# Patient Record
Sex: MALE | Race: WHITE | NOT HISPANIC OR LATINO | Employment: OTHER | URBAN - METROPOLITAN AREA
[De-identification: names, ages, dates, MRNs, and addresses within clinical notes are randomized per-mention and may not be internally consistent; named-entity substitution may affect disease eponyms.]

---

## 2018-12-18 ENCOUNTER — OFFICE VISIT (OUTPATIENT)
Dept: GASTROENTEROLOGY | Facility: CLINIC | Age: 78
End: 2018-12-18

## 2018-12-18 VITALS — SYSTOLIC BLOOD PRESSURE: 181 MMHG | DIASTOLIC BLOOD PRESSURE: 79 MMHG | WEIGHT: 175 LBS | HEART RATE: 70 BPM

## 2018-12-18 DIAGNOSIS — R10.11 RIGHT UPPER QUADRANT ABDOMINAL PAIN: ICD-10-CM

## 2018-12-18 DIAGNOSIS — R10.12 LEFT UPPER QUADRANT PAIN: Primary | ICD-10-CM

## 2018-12-18 PROCEDURE — 99203 OFFICE O/P NEW LOW 30 MIN: CPT | Performed by: INTERNAL MEDICINE

## 2018-12-18 NOTE — PROGRESS NOTES
PCP:  Jason Vora MD     No referring provider defined for this encounter.    Chief Complaint   Patient presents with   • Abdominal Pain     New patient        HPI   The patient is an 78-year-old here for a consultation.  He was having abdominal pain.  He was tested for Helicobacter pylori and this was negative.  He had an ultrasound of the right upper quadrant which apparently just showed fatty liver.  At that time, he was having pain in the right upper and left upper quadrant.  He started himself on a FODMAP diet.  This appeared to eliminate his pain.  He also stopped eating bread.  His symptoms have been improved over the last 5 weeks.  He doesn't complain of any blood in the stool.  His bowel habits appear to be doing well.  His ultrasound was performed in August of this year.  Looking at old records his last colonoscopy was in November 2014.  He had several small adenomatous polyps removed.  He had diverticulosis as well.  A 5 year follow-up is suggested.  Is enjoyed relatively good health.    No Known Allergies     No current outpatient medications on file.     History reviewed. No pertinent past medical history.    Past Surgical History:   Procedure Laterality Date   • APPENDECTOMY     • CYST REMOVAL          Social History     Socioeconomic History   • Marital status:      Spouse name: Not on file   • Number of children: Not on file   • Years of education: Not on file   • Highest education level: Not on file   Social Needs   • Financial resource strain: Not on file   • Food insecurity - worry: Not on file   • Food insecurity - inability: Not on file   • Transportation needs - medical: Not on file   • Transportation needs - non-medical: Not on file   Occupational History   • Not on file   Tobacco Use   • Smoking status: Former Smoker, quit 1978    • Smokeless tobacco: Never Used   Substance and Sexual Activity   • Alcohol use: No     Frequency: Never   • Drug use: No   • Sexual activity: Not  on file   Other Topics Concern   • Not on file   Social History Narrative   • Not on file        History reviewed. No pertinent family history.     Review of Systems   Constitutional: Negative for unexpected weight loss.   HENT: Negative for trouble swallowing.    Eyes: Negative.    Respiratory: Negative.    Gastrointestinal: Positive for abdominal pain. Negative for abdominal distention, anal bleeding, blood in stool, constipation, diarrhea, nausea, rectal pain, vomiting, GERD and indigestion.   Endocrine: Negative.    Genitourinary: Negative.    Musculoskeletal: Negative.    Skin: Negative.    Allergic/Immunologic: Negative.    Neurological: Negative.    Hematological: Negative.    Psychiatric/Behavioral: Negative.         Vitals:    12/18/18 1354   BP: (!) 181/79   Pulse: 70        Physical Exam   General Appearance: Alert, in no acute distress   Head: Normocephalic, without obvious abnormality, atraumatic   Eyes: Lids and lashes normal, conjunctivae and sclerae normal, no icterus, no pallor, corneas clear, PERRLA   Ears: Ears appear intact with no abnormalities noted   Throat: No oral lesions, no thrush, oral mucosa moist   Neck: No adenopathy, supple, trachea midline, no thyromegaly, no JVD   Lungs: Clear to auscultation,respirations regular, even and unlabored Heart: Regular rhythm and normal rate, normal S1 and S2, no murmur, no gallop, no rub, no click   Chest Wall: Symmetrical respiratory expansion   Abdomen: Normal bowel sounds, no masses, no organomegaly, soft non-tender, non-distended, no guarding, no rebound tenderness   Extremities: Moves all extremities well, no edema, no cyanosis, no redness   Skin: No bleeding, bruising or rash   Neurologic: Cranial nerves 2 - 12 grossly intact, no focal deficits       Freeman was seen today for abdominal pain.    Diagnoses and all orders for this visit:    Left upper quadrant pain    Right upper quadrant abdominal pain    The patient had both right upper and left  upper quadrant pain.  This appears to have resolved with a FODMAP diet.  He also has cut out bread.  Certainly I think its reasonable to perform a celiac panel.  He would like to hold off on this.  He is relatively asymptomatic at the moment so I don't want to do any additional testing at this point.  He is not yet due for a colonoscopy.  He will be due to next November.  Certainly if he has any troubles in the meantime we can perform earlier.  I oftentimes forego colonoscopy as a screening test in patients in their 80s, but he is relatively healthy and if that continues it probably would be in his best interest.  He will contact us with any change in his situation.     Blake Thomas MD

## 2018-12-19 PROBLEM — R10.12 LEFT UPPER QUADRANT PAIN: Status: ACTIVE | Noted: 2018-12-19

## 2018-12-19 PROBLEM — R10.11 RIGHT UPPER QUADRANT ABDOMINAL PAIN: Status: ACTIVE | Noted: 2018-12-19

## 2019-09-11 ENCOUNTER — TELEPHONE (OUTPATIENT)
Dept: GASTROENTEROLOGY | Facility: CLINIC | Age: 79
End: 2019-09-11

## 2019-09-11 DIAGNOSIS — R10.11 RIGHT UPPER QUADRANT ABDOMINAL PAIN: Primary | ICD-10-CM

## 2019-09-11 NOTE — TELEPHONE ENCOUNTER
Patient called stating that he was still having issues with abdominal pain and wanted to know  If he could schedule the HIDA scan that Dr. Thomas had discussed with him at his last appointment.  Order placed for HIDA.

## 2019-10-09 ENCOUNTER — HOSPITAL ENCOUNTER (OUTPATIENT)
Dept: NUCLEAR MEDICINE | Facility: HOSPITAL | Age: 79
Discharge: HOME | End: 2019-10-09

## 2019-10-09 DIAGNOSIS — R10.11 RIGHT UPPER QUADRANT ABDOMINAL PAIN: ICD-10-CM

## 2019-10-09 PROCEDURE — 25010000002 SINCALIDE PER 5 MCG: Performed by: INTERNAL MEDICINE

## 2019-10-09 PROCEDURE — A9537 TC99M MEBROFENIN: HCPCS | Performed by: INTERNAL MEDICINE

## 2019-10-09 PROCEDURE — 78227 HEPATOBIL SYST IMAGE W/DRUG: CPT

## 2019-10-09 PROCEDURE — 0 TECHNETIUM TC 99M MEBROFENIN KIT: Performed by: INTERNAL MEDICINE

## 2019-10-09 RX ORDER — KIT FOR THE PREPARATION OF TECHNETIUM TC 99M MEBROFENIN 45 MG/10ML
1 INJECTION, POWDER, LYOPHILIZED, FOR SOLUTION INTRAVENOUS
Status: COMPLETED | OUTPATIENT
Start: 2019-10-09 | End: 2019-10-09

## 2019-10-09 RX ADMIN — SINCALIDE 1.5 MCG: 5 INJECTION, POWDER, LYOPHILIZED, FOR SOLUTION INTRAVENOUS at 13:35

## 2019-10-09 RX ADMIN — MEBROFENIN 1 DOSE: 45 INJECTION, POWDER, LYOPHILIZED, FOR SOLUTION INTRAVENOUS at 12:25

## 2019-10-14 ENCOUNTER — OFFICE VISIT (OUTPATIENT)
Dept: GASTROENTEROLOGY | Facility: CLINIC | Age: 79
End: 2019-10-14

## 2019-10-14 VITALS — DIASTOLIC BLOOD PRESSURE: 64 MMHG | HEART RATE: 68 BPM | SYSTOLIC BLOOD PRESSURE: 173 MMHG

## 2019-10-14 DIAGNOSIS — R10.11 RIGHT UPPER QUADRANT ABDOMINAL PAIN: Primary | ICD-10-CM

## 2019-10-14 PROCEDURE — 99214 OFFICE O/P EST MOD 30 MIN: CPT | Performed by: INTERNAL MEDICINE

## 2019-10-14 NOTE — PROGRESS NOTES
PCP:  Jason Vora MD     No referring provider defined for this encounter.    Chief Complaint   Patient presents with   • Follow-up     follow up abdominal pain        HPI   Patient comes in with multiple questions.  He has had some problems with constant right upper quadrant pain.  It is about a 3 out of 10 in severity.  He has some increasing pain with eating spicy foods.  He stays away from fatty foods.  The increasing pain goes to a level of 5-6.  The pain does not typically wake him at night.  It will rarely wake him at night however.  He states the pain tends last for about an hour.  He has cut wheat out of his diet.  He has changed his diet and he actually likes his healthier diet at this point.  He does have some troubles with constipation.  He had a colonoscopy 11/5/2014 which showed some polyps.  He had a normal ultrasound 8/29/2019.  He subsequently had a HIDA scan which showed a low ejection fraction at 18%.  This was on 10/9/2019.  This did not reproduce his symptoms.  He has lost about 12 pounds.    No Known Allergies     No current outpatient medications on file.     History reviewed. No pertinent past medical history.    Past Surgical History:   Procedure Laterality Date   • APPENDECTOMY     • CYST REMOVAL          Social History     Socioeconomic History   • Marital status:      Spouse name: Not on file   • Number of children: Not on file   • Years of education: Not on file   • Highest education level: Not on file   Tobacco Use   • Smoking status: Former Smoker   • Smokeless tobacco: Never Used   Substance and Sexual Activity   • Alcohol use: No     Frequency: Never   • Drug use: No        Family History   Problem Relation Age of Onset   • Colon cancer Neg Hx         Review of Systems   Constitutional: Negative.    HENT: Negative for trouble swallowing and voice change.    Gastrointestinal: Positive for abdominal pain and constipation.        Vitals:    10/14/19 1449   BP: 173/64    Pulse: 68        Physical Exam   General Appearance: Alert, in no acute distress   Head: Normocephalic, without obvious abnormality, atraumatic   Eyes: Lids and lashes normal, conjunctivae and sclerae normal, no icterus, no pallor, corneas clear, PERRLA   Ears: Ears appear intact with no abnormalities noted   Extremities: Moves all extremities well, no edema, no cyanosis, no redness   Skin: No bleeding, bruising or rash   Neurologic: Cranial nerves 2 - 12 grossly intact, no focal deficits     Review of systems was reviewed and positives are noted. All of the remaining review of systems in that system are negative.    Gildardo was seen today for follow-up.    Diagnoses and all orders for this visit:    Right upper quadrant abdominal pain    Impressions and plan #1 right upper quadrant pain: The pain does exacerbate with eating.  It is not exacerbating with fatty meals.  He does not eat much in the way of fatty meals however.  The pain was not exacerbated at the time of his HIDA scan.  In addition, he has some constant right upper quadrant pain.  This would be atypical for gallbladder pain.  We talked about the fact that a dysfunctional gallbladder is typically not dangerous.  About 7 out of 10 patients with classic symptoms will get better with cholecystectomy.  I am concerned he may not get better.  I think would be best to consider an upper endoscopy.  We will look for hernias, esophagitis, peptic ulcer disease, and celiac disease.  We will make further recommendations at that time.    #2 history of colon polyps: He is 79 years old but he is quite functional.  We talked about the pros and cons.  We thought it might be best to get a colonoscopy at some point if the upper endoscopy fails to reveal any etiology.    Blake Thomas MD

## 2019-11-06 ENCOUNTER — LAB REQUISITION (OUTPATIENT)
Dept: LAB | Facility: HOSPITAL | Age: 79
End: 2019-11-06

## 2019-11-06 ENCOUNTER — OUTSIDE FACILITY SERVICE (OUTPATIENT)
Dept: GASTROENTEROLOGY | Facility: CLINIC | Age: 79
End: 2019-11-06

## 2019-11-06 DIAGNOSIS — R10.11 RIGHT UPPER QUADRANT PAIN: ICD-10-CM

## 2019-11-06 PROCEDURE — 88342 IMHCHEM/IMCYTCHM 1ST ANTB: CPT | Performed by: INTERNAL MEDICINE

## 2019-11-06 PROCEDURE — 43239 EGD BIOPSY SINGLE/MULTIPLE: CPT | Performed by: INTERNAL MEDICINE

## 2019-11-06 PROCEDURE — 88305 TISSUE EXAM BY PATHOLOGIST: CPT | Performed by: INTERNAL MEDICINE

## 2019-11-08 LAB
CYTO UR: NORMAL
LAB AP CASE REPORT: NORMAL
LAB AP CLINICAL INFORMATION: NORMAL
PATH REPORT.FINAL DX SPEC: NORMAL
PATH REPORT.GROSS SPEC: NORMAL

## 2020-10-26 ENCOUNTER — OFFICE VISIT (OUTPATIENT)
Dept: GASTROENTEROLOGY | Facility: CLINIC | Age: 80
End: 2020-10-26

## 2020-10-26 VITALS — WEIGHT: 171 LBS

## 2020-10-26 DIAGNOSIS — K63.5 POLYP OF COLON, UNSPECIFIED PART OF COLON, UNSPECIFIED TYPE: ICD-10-CM

## 2020-10-26 DIAGNOSIS — R10.11 RIGHT UPPER QUADRANT ABDOMINAL PAIN: Primary | ICD-10-CM

## 2020-10-26 PROCEDURE — 99213 OFFICE O/P EST LOW 20 MIN: CPT | Performed by: INTERNAL MEDICINE

## 2020-10-26 RX ORDER — METOPROLOL SUCCINATE 25 MG/1
25 TABLET, EXTENDED RELEASE ORAL DAILY
COMMUNITY
End: 2022-11-04 | Stop reason: SDUPTHER

## 2020-10-26 NOTE — PROGRESS NOTES
PCP:  Jose Rose MD Pearce, Kevin Andrew, MD  [address]  [place] [address]    Chief Complaint   Patient presents with   • Follow-up     discuss scope needs        HPI   Patient is an 80-year-old gentleman who is here for evaluation.  He is actually doing fairly well.  He has developed hypertension since I last saw him.  His right upper quadrant discomfort has gone from about a 3 to a 1 out of 10 in severity.  He has changed his primary physician.  He had an upper endoscopy on 11/6/2019.  This showed no evidence of celiac disease.  There was some inflammation in the stomach as well as some focal intestinal metaplasia but H. pylori was negative.  His esophageal biopsies were negative for intestinal metaplasia.  He did have a duodenal ulcer at that time.  Is been about 6 years since his colonoscopy.  He has a history of polyps.  He had an ultrasound of the right upper quadrant in August 2018 which apparently showed fatty liver.  HIDA scan showed a low ejection fraction but did not reproduce his symptoms.    No Known Allergies       Current Outpatient Medications:   •  metoprolol succinate XL (TOPROL-XL) 25 MG 24 hr tablet, Take 25 mg by mouth Daily., Disp: , Rfl:      History reviewed. No pertinent past medical history.    Past Surgical History:   Procedure Laterality Date   • APPENDECTOMY     • CYST REMOVAL          Social History     Socioeconomic History   • Marital status:      Spouse name: Not on file   • Number of children: Not on file   • Years of education: Not on file   • Highest education level: Not on file   Tobacco Use   • Smoking status: Former Smoker   • Smokeless tobacco: Never Used   Substance and Sexual Activity   • Alcohol use: No     Frequency: Never   • Drug use: No        Family History   Problem Relation Age of Onset   • Colon cancer Neg Hx         Review of Systems   Constitutional: Negative.    HENT: Negative for trouble swallowing and voice change.     Gastrointestinal: Negative.         There were no vitals filed for this visit.     Physical Exam   General Appearance: Alert, in no acute distress   Head: Normocephalic, without obvious abnormality, atraumatic   Eyes: Lids and lashes normal, conjunctivae and sclerae normal, no icterus, no pallor, corneas clear, PERRLA   Ears: Ears appear intact with no abnormalities noted   Lungs: respirations regular, even and unlabored Heart: rate, normal   Chest Wall: Symmetrical respiratory expansion   Extremities: Moves all extremities well, no edema, no cyanosis, no redness   Skin: No bleeding, bruising or rash   Neurologic: Cranial nerves 2 - 12 grossly intact, no focal deficits     Review of systems was reviewed and positives are noted. All of the remaining review of systems in that system are negative.    Diagnoses and all orders for this visit:    1. Right upper quadrant abdominal pain (Primary)    2. Polyp of colon, unspecified part of colon, unspecified type    Impressions and plan #1 right upper quadrant discomfort: This appears to be improving significantly.  He did have a low ejection fraction on HIDA scan but at this point I would recommend getting his gallbladder out less if symptoms worsen.    #2 adenomatous colon polyps by history: His last colonoscopy was in November 2014.  We talked about the pros and cons of repeating at his age.  He is otherwise pretty healthy and would like to go ahead and I think that is very reasonable.  We will get that set up in the near future.    Blake Thomas MD

## 2020-10-28 PROBLEM — K63.5 POLYP OF COLON: Status: ACTIVE | Noted: 2020-10-28

## 2020-11-11 RX ORDER — SODIUM, POTASSIUM,MAG SULFATES 17.5-3.13G
2 SOLUTION, RECONSTITUTED, ORAL ORAL TAKE AS DIRECTED
Qty: 354 ML | Refills: 0 | Status: SHIPPED | OUTPATIENT
Start: 2020-11-11 | End: 2021-10-20

## 2020-11-15 ENCOUNTER — APPOINTMENT (OUTPATIENT)
Dept: PREADMISSION TESTING | Facility: HOSPITAL | Age: 80
End: 2020-11-15

## 2020-11-15 LAB — SARS-COV-2 RNA RESP QL NAA+PROBE: NOT DETECTED

## 2020-11-15 PROCEDURE — U0004 COV-19 TEST NON-CDC HGH THRU: HCPCS

## 2020-11-15 PROCEDURE — C9803 HOPD COVID-19 SPEC COLLECT: HCPCS

## 2020-11-18 ENCOUNTER — OUTSIDE FACILITY SERVICE (OUTPATIENT)
Dept: GASTROENTEROLOGY | Facility: CLINIC | Age: 80
End: 2020-11-18

## 2020-11-18 PROCEDURE — 45380 COLONOSCOPY AND BIOPSY: CPT | Performed by: INTERNAL MEDICINE

## 2020-11-18 PROCEDURE — 88305 TISSUE EXAM BY PATHOLOGIST: CPT | Performed by: INTERNAL MEDICINE

## 2020-11-18 PROCEDURE — 45385 COLONOSCOPY W/LESION REMOVAL: CPT | Performed by: INTERNAL MEDICINE

## 2020-11-19 ENCOUNTER — LAB REQUISITION (OUTPATIENT)
Dept: LAB | Facility: HOSPITAL | Age: 80
End: 2020-11-19

## 2020-11-19 DIAGNOSIS — Z12.11 ENCOUNTER FOR SCREENING FOR MALIGNANT NEOPLASM OF COLON: ICD-10-CM

## 2020-11-19 DIAGNOSIS — Z86.010 PERSONAL HISTORY OF COLONIC POLYPS: ICD-10-CM

## 2021-10-20 ENCOUNTER — OFFICE VISIT (OUTPATIENT)
Dept: FAMILY MEDICINE CLINIC | Facility: CLINIC | Age: 81
End: 2021-10-20

## 2021-10-20 VITALS
HEIGHT: 70 IN | BODY MASS INDEX: 24.74 KG/M2 | HEART RATE: 65 BPM | SYSTOLIC BLOOD PRESSURE: 172 MMHG | OXYGEN SATURATION: 99 % | WEIGHT: 172.8 LBS | DIASTOLIC BLOOD PRESSURE: 84 MMHG

## 2021-10-20 DIAGNOSIS — N40.1 BPH WITH OBSTRUCTION/LOWER URINARY TRACT SYMPTOMS: ICD-10-CM

## 2021-10-20 DIAGNOSIS — I10 SYSTOLIC HYPERTENSION: Primary | ICD-10-CM

## 2021-10-20 DIAGNOSIS — M10.9 GOUT, UNSPECIFIED CAUSE, UNSPECIFIED CHRONICITY, UNSPECIFIED SITE: ICD-10-CM

## 2021-10-20 DIAGNOSIS — N13.8 BPH WITH OBSTRUCTION/LOWER URINARY TRACT SYMPTOMS: ICD-10-CM

## 2021-10-20 PROBLEM — R03.0 ELEVATED BLOOD-PRESSURE READING WITHOUT DIAGNOSIS OF HYPERTENSION: Status: ACTIVE | Noted: 2020-07-10

## 2021-10-20 PROBLEM — H61.20 CERUMEN IMPACTION: Status: ACTIVE | Noted: 2020-08-24

## 2021-10-20 PROBLEM — L81.9 PIGMENTED SKIN LESION SUSPICIOUS FOR MALIGNANT NEOPLASM: Status: ACTIVE | Noted: 2019-04-23

## 2021-10-20 PROBLEM — K29.60 GASTRITIS, EROSIVE: Status: ACTIVE | Noted: 2020-07-10

## 2021-10-20 PROBLEM — R03.0 ELEVATED BLOOD-PRESSURE READING WITHOUT DIAGNOSIS OF HYPERTENSION: Status: RESOLVED | Noted: 2020-07-10 | Resolved: 2021-10-20

## 2021-10-20 PROBLEM — K26.9 DUODENAL ULCER WITHOUT HEMORRHAGE OR PERFORATION: Status: ACTIVE | Noted: 2019-12-18

## 2021-10-20 PROCEDURE — 99204 OFFICE O/P NEW MOD 45 MIN: CPT | Performed by: INTERNAL MEDICINE

## 2021-10-20 RX ORDER — FAMOTIDINE 40 MG/1
TABLET, FILM COATED ORAL
COMMUNITY
Start: 2021-08-18 | End: 2021-10-20

## 2021-10-20 RX ORDER — TAMSULOSIN HYDROCHLORIDE 0.4 MG/1
1 CAPSULE ORAL DAILY
Qty: 90 CAPSULE | Refills: 3 | Status: SHIPPED | OUTPATIENT
Start: 2021-10-20 | End: 2022-11-07

## 2021-10-20 RX ORDER — HYDROCORTISONE 25 MG/G
CREAM TOPICAL
COMMUNITY
Start: 2021-09-13 | End: 2021-10-20

## 2021-10-20 RX ORDER — FAMOTIDINE 40 MG/1
40 TABLET, FILM COATED ORAL
COMMUNITY
Start: 2021-09-14 | End: 2022-09-14

## 2021-10-20 NOTE — PROGRESS NOTES
Gildardo Prajapati  1940  [id number]  Patient Care Team:  Jose Rose MD as PCP - General (Sports Medicine)    Gildardo Prajapati is a 81 y.o. male here today to establish care.  This patient is accompanied by their self who contributes to the history of their care.    Chief Complaint:    Chief Complaint   Patient presents with   • Establish Care   • Hypertension     Checked his home cuff against ours and it was the same.         History of Present Illness:    81 yyear old male, transferring care from . Has been followed for white coat hypertension versus central hypertension with home blood pressure readings consistently normal while on toprol xl. His MD at  determined that his home blood pressure cuff is reasonably well calibrated. Typically  his blood pressure levels are significantly higher in a clinical setting  than at home. At last visit his MD had  ordered ambulatory blood pressure monitoring,      Has dfficulty with urinary stream, dribbling. Nocturia q 2 hrs. Has suprapubic discomfort. The LUTS have been present and progressing for years. The discomfort presernt only 6 mons. Denies hematuria.Discomfort eases with urinating. Does not feel he completely emptys bladder.    Has seen Dr. Cortes in the past and underwent bx for what turned out to be a benign lesion.    Has gout and has to consume much water throughout the day. Has done a high purine elimination diet with success        Past Medical History:   Diagnosis Date   • Colon polyp 11/5/2014 Dr. SRINATH Thomas    2 Polyps   • Diverticulosis 8/11/2003 Dr. SRINATH King    1 Polyp   • Gout    • HL (hearing loss) 8/23/2016  Speech    Diminished   • Peptic ulceration        Past Surgical History:   Procedure Laterality Date   • APPENDECTOMY     • COLONOSCOPY  11/5/2014 Dr. SRINATH Thomas    [facility]. [place].   • CYST REMOVAL          Family History   Problem Relation Age of Onset   • Hypertension Mother    • Ulcerative colitis Father    • Colon  "cancer Neg Hx    • Cancer Neg Hx    • Diabetes Neg Hx        Social History     Socioeconomic History   • Marital status:    Tobacco Use   • Smoking status: Former Smoker     Packs/day: 1.00     Years: 15.00     Pack years: 15.00     Types: Cigarettes     Start date: 1963     Quit date: 1978     Years since quittin.8   • Smokeless tobacco: Never Used   Vaping Use   • Vaping Use: Never used   Substance and Sexual Activity   • Alcohol use: No   • Drug use: No   • Sexual activity: Not Currently     Partners: Female     Birth control/protection: Abstinence, Post-menopausal       No Known Allergies    Review of Systems:    Review of Systems   Constitutional: Negative.    Eyes:        Evolving cataract   Respiratory: Negative.    Cardiovascular: Negative.    Gastrointestinal: Negative.    Endocrine: Negative for cold intolerance, heat intolerance, polydipsia and polyuria.   Genitourinary: Positive for difficulty urinating, frequency and nocturia. Negative for flank pain and hematuria.        Sp discomfort which emproves with voiding. Incomplete voiding   Skin: Negative.    Neurological: Negative for weakness, numbness and headache.       Vitals:    10/20/21 0906   BP: 172/84   Pulse: 65   SpO2: 99%   Weight: 78.4 kg (172 lb 12.8 oz)   Height: 177.8 cm (70\")     Body mass index is 24.79 kg/m².      Current Outpatient Medications:   •  famotidine (PEPCID) 40 MG tablet, Take 40 mg by mouth., Disp: , Rfl:   •  metoprolol succinate XL (TOPROL-XL) 25 MG 24 hr tablet, Take 25 mg by mouth Daily., Disp: , Rfl:   •  tamsulosin (FLOMAX) 0.4 MG capsule 24 hr capsule, Take 1 capsule by mouth Daily., Disp: 90 capsule, Rfl: 3    Physical Exam:    Physical Exam  Vitals and nursing note reviewed.   Constitutional:       General: He is not in acute distress.     Appearance: He is well-developed. He is not diaphoretic.   HENT:      Head: Normocephalic and atraumatic.      Right Ear: External ear normal.      Left Ear: " External ear normal.      Mouth/Throat:      Pharynx: No oropharyngeal exudate.   Eyes:      General: No scleral icterus.        Right eye: No discharge.         Left eye: No discharge.      Extraocular Movements: Extraocular movements intact.      Conjunctiva/sclera: Conjunctivae normal.   Neck:      Thyroid: No thyromegaly.      Vascular: No JVD.      Trachea: No tracheal deviation.   Cardiovascular:      Rate and Rhythm: Normal rate and regular rhythm.      Pulses: Normal pulses.      Heart sounds: Normal heart sounds.      Comments: PMI nondisplaced  Pulmonary:      Effort: Pulmonary effort is normal.      Breath sounds: Normal breath sounds. No wheezing or rales.   Abdominal:      General: Bowel sounds are normal.      Palpations: Abdomen is soft.   Musculoskeletal:      Cervical back: Normal range of motion and neck supple.      Comments: Normal gait   Lymphadenopathy:      Cervical: No cervical adenopathy.   Skin:     General: Skin is warm and dry.      Capillary Refill: Capillary refill takes less than 2 seconds.      Coloration: Skin is not pale.      Findings: No rash.   Neurological:      Mental Status: He is alert and oriented to person, place, and time.      Motor: No abnormal muscle tone.      Coordination: Coordination normal.   Psychiatric:         Judgment: Judgment normal.         Procedures    Results Review:  Ref Range & Units  09/13/21 10:07   Prostate Cancer Screen, Serum   0.00 - 6.50 ng/mL 4.66      Ref Range & Units 1 mo ago   Uric Acid, Plasma   3.7 - 8.0 mg/dL 7.4      Glucose, Plasma   74 - 99 mg/dL 95    BUN, Plasma   8 - 23 mg/dL 15    Creatinine, Plasma   0.80 - 1.30 mg/dL 1.08    BUN/Creatinine Ratio  14    Sodium, Plasma   136 - 145 mmol/L 141    Potassium, Plasma   3.7 - 4.8 mmol/L 4.6    Chloride, Plasma   97 - 107 mmol/L 105    CO2, Plasma   22 - 29 mmol/L 27    Anion Gap   6 - 16 mmol/L 9    Total Calcium, Plasma   8.9 - 10.2 mg/dL 9.9    eGFR   >60 mL/min/1.73m*2 >60          I  reviewed the patient's new clinical results.    Assessment/Plan:     Problem List Items Addressed This Visit        Cardiac and Vasculature    Systolic hypertension - Primary    Current Assessment & Plan     Hypertension is improving with lifestyle modifications.  Continue current treatment regimen.  Dietary sodium restriction.  Regular aerobic exercise.  Continue current medications.  Ambulatory blood pressure monitoring.  Large element of documented whitecoat hypertension as well  Blood pressure will be reassessed at the next regular appointment.         Relevant Medications    metoprolol succinate XL (TOPROL-XL) 25 MG 24 hr tablet       Genitourinary and Reproductive     BPH with obstruction/lower urinary tract symptoms    Current Assessment & Plan     We discussed the likely etiology of his suprapubic discomfort is distended bladder and inadequate voiding. We have initiated Flomax 0.4 mg at bedtime. If symptoms worsen or prior to revisiting the office in February for his Medicare wellness exam, he will notify for urologic referral.         Relevant Medications    tamsulosin (FLOMAX) 0.4 MG capsule 24 hr capsule       Musculoskeletal and Injuries    Gout    Current Assessment & Plan     Is controlling with low purine diet hydration.               Plan of care reviewed with patient at the conclusion of today's visit. Education was provided regarding diagnosis and management.  Patient verbalizes understanding of and agreement with management plan.    Return in about 4 months (around 2/25/2022) for Medicare Wellness.    Wilbur Quinones MD    Please note that portions of this note may have been completed with a voice recognition program. Efforts were made to edit the dictations, but occasionally words are mistranscribed.

## 2021-10-20 NOTE — ASSESSMENT & PLAN NOTE
Hypertension is improving with lifestyle modifications.  Continue current treatment regimen.  Dietary sodium restriction.  Regular aerobic exercise.  Continue current medications.  Ambulatory blood pressure monitoring.  Large element of documented whitecoat hypertension as well  Blood pressure will be reassessed at the next regular appointment.

## 2021-10-20 NOTE — ASSESSMENT & PLAN NOTE
We discussed the likely etiology of his suprapubic discomfort is distended bladder and inadequate voiding. We have initiated Flomax 0.4 mg at bedtime. If symptoms worsen or prior to revisiting the office in February for his Medicare wellness exam, he will notify for urologic referral.

## 2022-02-01 ENCOUNTER — TELEPHONE (OUTPATIENT)
Dept: FAMILY MEDICINE CLINIC | Facility: CLINIC | Age: 82
End: 2022-02-01

## 2022-02-01 NOTE — TELEPHONE ENCOUNTER
Caller: Gildardo Prajapati    Relationship: Self    Best call back number: [phone]    Who are you requesting to speak with (clinical staff, provider,  specific staff member): DR. HOOPER    What was the call regarding: PATIENT REQUESTED A CALL BACK FROM DR. HOOPER AND WOULD NOT PROVIDE ANY FURTHER INFORMATION ABOUT HIS REQUEST.    Do you require a callback: YES

## 2022-02-01 NOTE — TELEPHONE ENCOUNTER
Patient reports that his wife tested positive, he received a negative test but began experiencing 100.6, sore throat, headache, body aches. He has been using OTC medicines and reports that his symptoms are mild. He will call back tomorrow to check with PCP if no improvement.

## 2022-02-23 ENCOUNTER — OFFICE VISIT (OUTPATIENT)
Dept: FAMILY MEDICINE CLINIC | Facility: CLINIC | Age: 82
End: 2022-02-23

## 2022-02-23 ENCOUNTER — LAB (OUTPATIENT)
Dept: LAB | Facility: HOSPITAL | Age: 82
End: 2022-02-23

## 2022-02-23 VITALS
HEART RATE: 60 BPM | BODY MASS INDEX: 24.62 KG/M2 | WEIGHT: 172 LBS | OXYGEN SATURATION: 99 % | HEIGHT: 70 IN | SYSTOLIC BLOOD PRESSURE: 124 MMHG | DIASTOLIC BLOOD PRESSURE: 82 MMHG

## 2022-02-23 DIAGNOSIS — N40.1 BPH WITH OBSTRUCTION/LOWER URINARY TRACT SYMPTOMS: ICD-10-CM

## 2022-02-23 DIAGNOSIS — Z13.220 SCREENING CHOLESTEROL LEVEL: ICD-10-CM

## 2022-02-23 DIAGNOSIS — M10.9 GOUT, UNSPECIFIED CAUSE, UNSPECIFIED CHRONICITY, UNSPECIFIED SITE: ICD-10-CM

## 2022-02-23 DIAGNOSIS — I10 SYSTOLIC HYPERTENSION: ICD-10-CM

## 2022-02-23 DIAGNOSIS — N13.8 BPH WITH OBSTRUCTION/LOWER URINARY TRACT SYMPTOMS: ICD-10-CM

## 2022-02-23 DIAGNOSIS — Z13.29 THYROID DISORDER SCREENING: ICD-10-CM

## 2022-02-23 DIAGNOSIS — I10 SYSTOLIC HYPERTENSION: Primary | ICD-10-CM

## 2022-02-23 LAB
ALBUMIN SERPL-MCNC: 4.4 G/DL (ref 3.5–5.2)
ALBUMIN/GLOB SERPL: 1.3 G/DL
ALP SERPL-CCNC: 84 U/L (ref 39–117)
ALT SERPL W P-5'-P-CCNC: 21 U/L (ref 1–41)
ANION GAP SERPL CALCULATED.3IONS-SCNC: 10.3 MMOL/L (ref 5–15)
AST SERPL-CCNC: 22 U/L (ref 1–40)
BILIRUB SERPL-MCNC: 0.8 MG/DL (ref 0–1.2)
BUN SERPL-MCNC: 13 MG/DL (ref 8–23)
BUN/CREAT SERPL: 13.4 (ref 7–25)
CALCIUM SPEC-SCNC: 9.8 MG/DL (ref 8.6–10.5)
CHLORIDE SERPL-SCNC: 104 MMOL/L (ref 98–107)
CHOLEST SERPL-MCNC: 158 MG/DL (ref 0–200)
CO2 SERPL-SCNC: 24.7 MMOL/L (ref 22–29)
CREAT SERPL-MCNC: 0.97 MG/DL (ref 0.76–1.27)
DEPRECATED RDW RBC AUTO: 40.2 FL (ref 37–54)
EOSINOPHIL # BLD MANUAL: 0.24 10*3/MM3 (ref 0–0.4)
EOSINOPHIL NFR BLD MANUAL: 4.4 % (ref 0.3–6.2)
ERYTHROCYTE [DISTWIDTH] IN BLOOD BY AUTOMATED COUNT: 16.6 % (ref 12.3–15.4)
GFR SERPL CREATININE-BSD FRML MDRD: 74 ML/MIN/1.73
GLOBULIN UR ELPH-MCNC: 3.3 GM/DL
GLUCOSE SERPL-MCNC: 101 MG/DL (ref 65–99)
HCT VFR BLD AUTO: 40.2 % (ref 37.5–51)
HDLC SERPL-MCNC: 39 MG/DL (ref 40–60)
HGB BLD-MCNC: 12.1 G/DL (ref 13–17.7)
LDLC SERPL CALC-MCNC: 104 MG/DL (ref 0–100)
LDLC/HDLC SERPL: 2.65 {RATIO}
LYMPHOCYTES # BLD MANUAL: 0.99 10*3/MM3 (ref 0.7–3.1)
LYMPHOCYTES NFR BLD MANUAL: 4.4 % (ref 5–12)
MCH RBC QN AUTO: 21.4 PG (ref 26.6–33)
MCHC RBC AUTO-ENTMCNC: 30.1 G/DL (ref 31.5–35.7)
MCV RBC AUTO: 71 FL (ref 79–97)
MONOCYTES # BLD: 0.24 10*3/MM3 (ref 0.1–0.9)
NEUTROPHILS # BLD AUTO: 4.06 10*3/MM3 (ref 1.7–7)
NEUTROPHILS NFR BLD MANUAL: 73.3 % (ref 42.7–76)
PLAT MORPH BLD: NORMAL
PLATELET # BLD AUTO: 280 10*3/MM3 (ref 140–450)
PMV BLD AUTO: 11.9 FL (ref 6–12)
POTASSIUM SERPL-SCNC: 4.3 MMOL/L (ref 3.5–5.2)
PROT SERPL-MCNC: 7.7 G/DL (ref 6–8.5)
RBC # BLD AUTO: 5.66 10*6/MM3 (ref 4.14–5.8)
RBC MORPH BLD: NORMAL
SODIUM SERPL-SCNC: 139 MMOL/L (ref 136–145)
TRIGL SERPL-MCNC: 79 MG/DL (ref 0–150)
TSH SERPL DL<=0.05 MIU/L-ACNC: 2.11 UIU/ML (ref 0.27–4.2)
VARIANT LYMPHS NFR BLD MANUAL: 17.8 % (ref 19.6–45.3)
VLDLC SERPL-MCNC: 15 MG/DL (ref 5–40)
WBC MORPH BLD: NORMAL
WBC NRBC COR # BLD: 5.54 10*3/MM3 (ref 3.4–10.8)

## 2022-02-23 PROCEDURE — 80061 LIPID PANEL: CPT

## 2022-02-23 PROCEDURE — 85025 COMPLETE CBC W/AUTO DIFF WBC: CPT

## 2022-02-23 PROCEDURE — G0439 PPPS, SUBSEQ VISIT: HCPCS | Performed by: INTERNAL MEDICINE

## 2022-02-23 PROCEDURE — 1160F RVW MEDS BY RX/DR IN RCRD: CPT | Performed by: INTERNAL MEDICINE

## 2022-02-23 PROCEDURE — 1170F FXNL STATUS ASSESSED: CPT | Performed by: INTERNAL MEDICINE

## 2022-02-23 PROCEDURE — 85007 BL SMEAR W/DIFF WBC COUNT: CPT

## 2022-02-23 PROCEDURE — 84443 ASSAY THYROID STIM HORMONE: CPT

## 2022-02-23 PROCEDURE — 80053 COMPREHEN METABOLIC PANEL: CPT

## 2022-02-23 RX ORDER — MULTIPLE VITAMINS W/ MINERALS TAB 9MG-400MCG
1 TAB ORAL DAILY
COMMUNITY

## 2022-02-23 NOTE — PROGRESS NOTES
The ABCs of the Annual Wellness Visit  Subsequent Medicare Wellness Visit    Chief Complaint   Patient presents with   • Medicare Wellness-subsequent     want blood work with PCR to see if he had covid      Subjective    History of Present Illness:  Gildardo Prajapati is a 81 y.o. male who presents for a Subsequent Medicare Wellness Visit.    Here for follow up sleeping better after starting tanulsoin Nocturai markedly improved. Bps running well at home. Still attending to low purine diet. Last gout flare  > one year ago.    Current on Covid vaccinations. Current with Ophth and dentist. Safety  measure compliant.    Was exposed to Covid with wife ill 3 weeks ago. He himself had URI sx but tested negative. Feels better daily .  Had PSA drawn last September.  Exercise 30 minutes/day on a treadmill 5 days/week.  Has any chest pain shortness of breath orthopnea or PND    The following portions of the patient's history were reviewed and   updated as appropriate:   He  has a past medical history of Colon polyp (11/5/2014 Dr. SRINATH Thomas), Diverticulosis (8/11/2003 Dr. SRINATH King), GERD (gastroesophageal reflux disease) (2/11/15 Dr. Vora), Gout, HL (hearing loss) (8/23/2016 Dr. Gooden), and Peptic ulceration.  He does not have any pertinent problems on file.  He  has a past surgical history that includes Appendectomy; Cyst Removal; and Colonoscopy (11/5/2014 Dr. SRINATH Thomas).  His family history includes Hyperlipidemia in his mother; Hypertension in his mother; Ulcerative colitis in his father.  He  reports that he quit smoking about 43 years ago. His smoking use included cigarettes. He started smoking about 59 years ago. He has a 15.00 pack-year smoking history. He has never used smokeless tobacco. He reports that he does not drink alcohol and does not use drugs.  Current Outpatient Medications   Medication Sig Dispense Refill   • famotidine (PEPCID) 40 MG tablet Take 40 mg by mouth.     • metoprolol succinate XL  (TOPROL-XL) 25 MG 24 hr tablet Take 25 mg by mouth Daily.     • multivitamin with minerals (Centrum Silver 50+Men) tablet tablet Take 1 tablet by mouth Daily.     • tamsulosin (FLOMAX) 0.4 MG capsule 24 hr capsule Take 1 capsule by mouth Daily. 90 capsule 3     No current facility-administered medications for this visit.     Current Outpatient Medications on File Prior to Visit   Medication Sig   • famotidine (PEPCID) 40 MG tablet Take 40 mg by mouth.   • metoprolol succinate XL (TOPROL-XL) 25 MG 24 hr tablet Take 25 mg by mouth Daily.   • multivitamin with minerals (Centrum Silver 50+Men) tablet tablet Take 1 tablet by mouth Daily.   • tamsulosin (FLOMAX) 0.4 MG capsule 24 hr capsule Take 1 capsule by mouth Daily.     No current facility-administered medications on file prior to visit.     He has No Known Allergies..    Compared to one year ago, the patient feels his physical   health is the same.    Compared to one year ago, the patient feels his mental   health is the same.    Recent Hospitalizations:  He was not admitted to the hospital during the last year.       Current Medical Providers:  Patient Care Team:  Wilbur Quinones MD as PCP - General (Internal Medicine)    Outpatient Medications Prior to Visit   Medication Sig Dispense Refill   • famotidine (PEPCID) 40 MG tablet Take 40 mg by mouth.     • metoprolol succinate XL (TOPROL-XL) 25 MG 24 hr tablet Take 25 mg by mouth Daily.     • multivitamin with minerals (Centrum Silver 50+Men) tablet tablet Take 1 tablet by mouth Daily.     • tamsulosin (FLOMAX) 0.4 MG capsule 24 hr capsule Take 1 capsule by mouth Daily. 90 capsule 3     No facility-administered medications prior to visit.       No opioid medication identified on active medication list. I have reviewed chart for other potential  high risk medication/s and harmful drug interactions in the elderly.          Aspirin is not on active medication list.  Aspirin use is not indicated based on review of  "current medical condition/s. Risk of harm outweighs potential benefits.  .    Patient Active Problem List   Diagnosis   • Right upper quadrant abdominal pain   • Left upper quadrant pain   • Polyp of colon   • Cerumen impaction   • Duodenal ulcer without hemorrhage or perforation   • Gastritis, erosive   • Pigmented skin lesion suspicious for malignant neoplasm   • Systolic hypertension   • BPH with obstruction/lower urinary tract symptoms   • Diverticulosis   • Gout     Advance Care Planning  Advance Directive is not on file.  ACP discussion was held with the patient during this visit. Patient has an advance directive (not in EMR), copy requested.    Review of Systems   Constitutional: Negative.    HENT: Negative.    Respiratory: Negative.    Cardiovascular: Negative.    Gastrointestinal: Negative.    Endocrine: Negative.    Genitourinary: Negative.    Musculoskeletal: Negative.    Neurological: Negative.    Hematological: Negative.         Objective    Vitals:    02/23/22 0849   BP: 124/82   Pulse: 60   SpO2: 99%   Weight: 78 kg (172 lb)   Height: 177.8 cm (70\")   PainSc: 0-No pain     BMI Readings from Last 1 Encounters:   02/23/22 24.68 kg/m²   BMI is within normal parameters. No follow-up required.    Does the patient have evidence of cognitive impairment? No    Physical Exam  Vitals and nursing note reviewed.   Constitutional:       General: He is not in acute distress.     Appearance: Normal appearance. He is well-developed. He is not diaphoretic.   HENT:      Head: Normocephalic and atraumatic.      Right Ear: External ear normal.      Left Ear: External ear normal.      Mouth/Throat:      Mouth: Mucous membranes are moist.      Pharynx: Oropharynx is clear. No oropharyngeal exudate.   Eyes:      General: No scleral icterus.        Right eye: No discharge.         Left eye: No discharge.      Extraocular Movements: Extraocular movements intact.      Conjunctiva/sclera: Conjunctivae normal.   Neck:      " Thyroid: No thyromegaly.      Vascular: No JVD.      Trachea: No tracheal deviation.   Cardiovascular:      Rate and Rhythm: Normal rate and regular rhythm.      Pulses: Normal pulses.      Heart sounds: Normal heart sounds.      Comments: PMI nondisplaced  Pulmonary:      Effort: Pulmonary effort is normal.      Breath sounds: Normal breath sounds. No wheezing or rales.   Abdominal:      General: Bowel sounds are normal. There is no distension.      Palpations: Abdomen is soft. There is no mass.      Tenderness: There is no abdominal tenderness. There is no guarding or rebound.   Musculoskeletal:         General: No swelling, tenderness, deformity or signs of injury.      Cervical back: Normal range of motion and neck supple.      Right lower leg: No edema.      Left lower leg: No edema.      Comments: Normal gait   Lymphadenopathy:      Cervical: No cervical adenopathy.   Skin:     General: Skin is warm and dry.      Capillary Refill: Capillary refill takes less than 2 seconds.      Coloration: Skin is not pale.      Findings: No rash.   Neurological:      Mental Status: He is alert and oriented to person, place, and time.      Motor: No abnormal muscle tone.      Coordination: Coordination normal.   Psychiatric:         Mood and Affect: Mood normal.         Behavior: Behavior normal.         Judgment: Judgment normal.                 HEALTH RISK ASSESSMENT    Smoking Status:  Social History     Tobacco Use   Smoking Status Former Smoker   • Packs/day: 1.00   • Years: 15.00   • Pack years: 15.00   • Types: Cigarettes   • Start date: 1963   • Quit date: 1978   • Years since quittin.1   Smokeless Tobacco Never Used     Alcohol Consumption:  Social History     Substance and Sexual Activity   Alcohol Use No     Fall Risk Screen:    STEADI Fall Risk Assessment was completed, and patient is at LOW risk for falls.Assessment completed on:2022    Depression Screening:  PHQ-2/PHQ-9 Depression Screening  2/23/2022   Little interest or pleasure in doing things 0   Feeling down, depressed, or hopeless 0   Total Score 0       Health Habits and Functional and Cognitive Screening:  Functional & Cognitive Status 2/23/2022   Do you have difficulty preparing food and eating? No   Do you have difficulty bathing yourself, getting dressed or grooming yourself? No   Do you have difficulty using the toilet? No   Do you have difficulty moving around from place to place? No   Do you have trouble with steps or getting out of a bed or a chair? No   Current Diet Well Balanced Diet   Dental Exam Up to date   Eye Exam Up to date   Exercise (times per week) 4 times per week   Current Exercises Include Walking   Do you need help using the phone?  No   Are you deaf or do you have serious difficulty hearing?  Yes   Do you need help with transportation? No   Do you need help shopping? No   Do you need help preparing meals?  No   Do you need help with housework?  No   Do you need help with laundry? No   Do you need help taking your medications? No   Do you need help managing money? No   Do you ever drive or ride in a car without wearing a seat belt? No   Have you felt unusual stress, anger or loneliness in the last month? No   Who do you live with? Spouse   If you need help, do you have trouble finding someone available to you? No   Have you been bothered in the last four weeks by sexual problems? No   Do you have difficulty concentrating, remembering or making decisions? No       Age-appropriate Screening Schedule:  Refer to the list below for future screening recommendations based on patient's age, sex and/or medical conditions. Orders for these recommended tests are listed in the plan section. The patient has been provided with a written plan.    Health Maintenance   Topic Date Due   • TDAP/TD VACCINES (2 - Td or Tdap) 08/26/2030   • INFLUENZA VACCINE  Completed   • ZOSTER VACCINE  Completed              Assessment/Plan   CMS Preventative  Services Quick Reference  Risk Factors Identified During Encounter  Cardiovascular Disease  Dementia/Memory   Glaucoma or Family History of Glaucoma  Immunizations Discussed/Encouraged (specific Immunizations; Current on all  Inactivity/Sedentary  The above risks/problems have been discussed with the patient.  Follow up actions/plans if indicated are seen below in the Assessment/Plan Section.  Pertinent information has been shared with the patient in the After Visit Summary.    Diagnoses and all orders for this visit:    1. Systolic hypertension (Primary)  Assessment & Plan:  Hypertension is unchanged.  Continue current treatment regimen.  Dietary sodium restriction.  Regular aerobic exercise.  Continue current medications.  Blood pressure will be reassessed at the next regular appointment.    Orders:  -     CBC & Differential; Future  -     Comprehensive Metabolic Panel; Future    2. BPH with obstruction/lower urinary tract symptoms  Assessment & Plan:  Luts improved with Flomax      3. Gout, unspecified cause, unspecified chronicity, unspecified site  -     CBC & Differential; Future  -     Comprehensive Metabolic Panel; Future    4. Screening cholesterol level  -     Lipid Panel; Future    5. Thyroid disorder screening  -     TSH Rfx On Abnormal To Free T4; Future      Follow Up:   Return in about 6 months (around 8/23/2022) for htn/gout.     An After Visit Summary and PPPS were made available to the patient.

## 2022-04-04 PROCEDURE — 99282 EMERGENCY DEPT VISIT SF MDM: CPT

## 2022-04-05 ENCOUNTER — APPOINTMENT (OUTPATIENT)
Dept: GENERAL RADIOLOGY | Facility: HOSPITAL | Age: 82
End: 2022-04-05

## 2022-04-05 ENCOUNTER — HOSPITAL ENCOUNTER (EMERGENCY)
Facility: HOSPITAL | Age: 82
Discharge: HOME | End: 2022-04-05
Attending: STUDENT IN AN ORGANIZED HEALTH CARE EDUCATION/TRAINING PROGRAM | Admitting: STUDENT IN AN ORGANIZED HEALTH CARE EDUCATION/TRAINING PROGRAM

## 2022-04-05 VITALS
WEIGHT: 170 LBS | DIASTOLIC BLOOD PRESSURE: 56 MMHG | RESPIRATION RATE: 16 BRPM | TEMPERATURE: 98.3 F | OXYGEN SATURATION: 94 % | SYSTOLIC BLOOD PRESSURE: 133 MMHG | HEIGHT: 70 IN | HEART RATE: 59 BPM | BODY MASS INDEX: 24.34 KG/M2

## 2022-04-05 DIAGNOSIS — M25.521 RIGHT ELBOW PAIN: ICD-10-CM

## 2022-04-05 DIAGNOSIS — M70.21 OLECRANON BURSITIS OF RIGHT ELBOW: Primary | ICD-10-CM

## 2022-04-05 PROCEDURE — 73070 X-RAY EXAM OF ELBOW: CPT

## 2022-04-05 RX ORDER — IBUPROFEN 600 MG/1
600 TABLET ORAL EVERY 6 HOURS PRN
Qty: 20 TABLET | Refills: 0 | Status: SHIPPED | OUTPATIENT
Start: 2022-04-05 | End: 2022-04-10

## 2022-04-05 NOTE — DISCHARGE INSTRUCTIONS
Symptomatic care is recommended with anti-inflammatory. Take all medications as prescribed and instructed. Follow up with your primary care as needed and orthopedic as directed. Return to Emergency Department with worsening of symptoms.

## 2022-04-09 NOTE — ED PROVIDER NOTES
[place]    EMERGENCY DEPARTMENT ENCOUNTER      Pt Name: Gildardo Prajapati  MRN: [id number]  YOB: 1940  Date of evaluation: 4/4/2022  Provider: RIVAS Whelan    CHIEF COMPLAINT       Chief Complaint   Patient presents with   • Elbow Problem         HISTORY OF PRESENT ILLNESS  (Location/Symptom, Timing/Onset, Context/Setting, Quality, Duration, Modifying Factors, Severity.)   Gildardo Prajapati is a 82 y.o. male who presents to the emergency department with complaints of an area of localized swelling and tenderness to his left elbow. He denies any recent trauma or injuries.  He reports an associated symptom of an elevated blood pressure. He reports that his discomfort in his elbow is worse with movement and palpation. He has not tried anything for the pain. He denies any additional symptoms on exam.     [facility]   Nursing notes were reviewed.    REVIEW OF SYSTEMS    (2-9 systems for level 4, 10 or more for level 5)   Review of Systems   Constitutional: Negative.    Respiratory: Negative.    Cardiovascular: Negative.    Gastrointestinal: Negative.    Musculoskeletal: Positive for arthralgias and joint swelling.   Skin: Positive for color change.        All systems reviewed and negative except for those discussed in HPI.   PAST MEDICAL HISTORY     Past Medical History:   Diagnosis Date   • Colon polyp 11/5/2014 Dr. SRINATH Thomas    2 Polyps   • Diverticulosis 8/11/2003 Dr. SRINATH King    1 Polyp   • Gout    • HL (hearing loss) 8/23/2016 Dr. Gooden    Diminished   • Hypertension    • Peptic ulceration          SURGICAL HISTORY       Past Surgical History:   Procedure Laterality Date   • APPENDECTOMY     • COLONOSCOPY  11/5/2014 Dr. SRINATH Thomas    [facility].   • CYST REMOVAL           CURRENT MEDICATIONS     No current facility-administered medications for this encounter.    Current Outpatient Medications:   •  famotidine (PEPCID) 40 MG tablet, Take 40 mg by mouth., Disp: , Rfl:   •  metoprolol  succinate XL (TOPROL-XL) 25 MG 24 hr tablet, Take 25 mg by mouth Daily., Disp: , Rfl:   •  multivitamin with minerals tablet tablet, Take 1 tablet by mouth Daily., Disp: , Rfl:   •  ibuprofen (ADVIL,MOTRIN) 600 MG tablet, Take 1 tablet by mouth Every 6 (Six) Hours As Needed for Mild Pain  or Moderate Pain  for up to 5 days., Disp: 20 tablet, Rfl: 0  •  tamsulosin (FLOMAX) 0.4 MG capsule 24 hr capsule, Take 1 capsule by mouth Daily., Disp: 90 capsule, Rfl: 3    ALLERGIES     Patient has no known allergies.    FAMILY HISTORY       Family History   Problem Relation Age of Onset   • Hypertension Mother    • Hyperlipidemia Mother            • Ulcerative colitis Father    • Colon cancer Neg Hx    • Cancer Neg Hx    • Diabetes Neg Hx           SOCIAL HISTORY       Social History     Socioeconomic History   • Marital status:    Tobacco Use   • Smoking status: Former Smoker     Packs/day: 1.00     Years: 15.00     Pack years: 15.00     Types: Cigarettes     Start date: 1963     Quit date: 1978     Years since quittin.3   • Smokeless tobacco: Never Used   Vaping Use   • Vaping Use: Never used   Substance and Sexual Activity   • Alcohol use: No   • Drug use: No   • Sexual activity: Not Currently     Partners: Female     Birth control/protection: Abstinence, Post-menopausal         PHYSICAL EXAM    (up to 7 for level 4, 8 or more for level 5)   Physical Exam  Vitals and nursing note reviewed.   Constitutional:       General: He is not in acute distress.     Appearance: Normal appearance. He is well-developed. He is not toxic-appearing.   HENT:      Head: Normocephalic and atraumatic.      Nose: Nose normal.      Mouth/Throat:      Mouth: Mucous membranes are moist.   Eyes:      Extraocular Movements: Extraocular movements intact.   Cardiovascular:      Rate and Rhythm: Normal rate and regular rhythm.      Pulses: Normal pulses.   Pulmonary:      Effort: Pulmonary effort is normal. No respiratory  distress.      Breath sounds: Normal breath sounds.   Abdominal:      General: There is no distension.   Musculoskeletal:         General: Normal range of motion.      Right elbow: Swelling and effusion present. Tenderness present in olecranon process.      Left elbow: Normal.      Cervical back: Normal range of motion and neck supple.   Skin:     General: Skin is warm and dry.   Neurological:      General: No focal deficit present.      Mental Status: He is alert.   Psychiatric:         Behavior: Behavior normal.         Thought Content: Thought content normal.         Judgment: Judgment normal.          DIAGNOSTIC RESULTS     EKG: All EKGs are interpreted by the Emergency Department Physician who either signs or Co-signs this chart in the absence of a cardiologist.    No orders to display       RADIOLOGY:   Non-plain film images such as CT, Ultrasound and MRI are read by the radiologist. Plain radiographic images are visualized and preliminarily interpreted by the emergency physician with the below findings:      [x] Radiologist's Report Reviewed:  XR Elbow 2 View Right   Final Result      1.  Prominent soft tissue swelling over the olecranon that is either an infectious or inflammatory olecranon bursitis.       Electronically signed by:  Carlos Law M.D.     4/4/2022 11:27 PM Mountain Time            ED BEDSIDE ULTRASOUND:   Performed by ED Physician - none    LABS:    I have reviewed and interpreted all of the currently available lab results from this visit (if applicable):       All other labs were within normal range or not returned as of this dictation.      EMERGENCY DEPARTMENT COURSE and DIFFERENTIAL DIAGNOSIS/MDM:   Vitals:    Vitals:    04/05/22 0133 04/05/22 0141 04/05/22 0211 04/05/22 0228   BP: 146/86   133/56   BP Location: Left arm   Left arm   Patient Position: Sitting   Sitting   Pulse: 61   59   Resp: 16   16   Temp:       TempSrc:       SpO2: 94% 93% 93% 94%   Weight:       Height:            ED Course as of 04/09/22 1058   Tue Apr 05, 2022   0217 In summary this is an 82 year old male who presents to ED with complaints of increased swelling, erythema and tenderness to palpation at his right elbow. No acute or emergent findings demonstrated on physical exam.  X-ray of right elbow demonstrates prominent soft tissue swelling over the olecranon that is either an infectious or inflammatory olecranon bursitis. Clinical presentation consistent with olecranon bursitis.  Patient is afebrile, nontoxic appearing, vital signs stable and able to maintain O2 sats of 94% on room air. Patient will be discharged home with symptomatic care and outpatient follow up.  [JG]      ED Course User Index  [JG] Harsh Saini, PA       MDM     I had a discussion with the patient/family regarding diagnosis, diagnostic results, treatment plan, and medications.  The patient/family indicated understanding of these instructions.  I spent adequate time at the bedside preceding discharge necessary to personally discuss the aftercare instructions, giving patient education, providing explanations of the results of our evaluations/findings, and my decision making to assure that the patient/family understand the plan of care.  Time was allotted to answer questions at that time and throughout the ED course.  Emphasis was placed on timely follow-up after discharge.  I also discussed the potential for the development of an acute emergent condition requiring further evaluation, admission, or even surgical intervention. I discussed that we found nothing during the visit today indicating the need for further workup, admission, or the presence of an unstable medical condition.  I encouraged the patient to return to the emergency department immediately for ANY concerns, worsening, new complaints, or if symptoms persist and unable to seek follow-up in a timely fashion.  The patient/family expressed understanding and agreement with this plan.   The patient will follow-up with orthopedic for reevaluation.       MEDICATIONS ADMINISTERED IN ED:  Medications - No data to display    PROCEDURES:  Procedures          CRITICAL CARE TIME    Total Critical Care time was 0 minutes, excluding separately reportable procedures.   There was a high probability of clinically significant/life threatening deterioration in the patient's condition which required my urgent intervention.      FINAL IMPRESSION      1. Olecranon bursitis of right elbow    2. Right elbow pain          DISPOSITION/PLAN     ED Disposition     ED Disposition   Discharge    Condition   Stable    Comment   --             PATIENT REFERRED TO:  Wilbur Quinones MD  [address] [place] [address]  [phone]    Call   As needed for follow-up with primary care    Baldomero Adorno MD  [address]  [address]  [place] [id number] [phone]    Schedule an appointment as soon as possible for a visit   Call for follow-up appointment with orthopedic    [facility] Emergency Department  [address] [address] [phone]  Go to   If symptoms worsen      DISCHARGE MEDICATIONS:     Medication List      START taking these medications    ibuprofen 600 MG tablet  Commonly known as: ADVIL,MOTRIN  Take 1 tablet by mouth Every 6 (Six) Hours As Needed for Mild Pain  or Moderate Pain  for up to 5 days.        CONTINUE taking these medications    famotidine 40 MG tablet  Commonly known as: PEPCID     metoprolol succinate XL 25 MG 24 hr tablet  Commonly known as: TOPROL-XL     multivitamin with minerals tablet tablet     tamsulosin 0.4 MG capsule 24 hr capsule  Commonly known as: FLOMAX  Take 1 capsule by mouth Daily.           Where to Get Your Medications      These medications were sent to [facility] [address] [place] [address] [address] [place] [address] AT [facility] [phone] [facility] [phone]   [address] [facility],  [facility] [address]    Phone: [phone]   · ibuprofen 600 MG tablet             Comment: Please note this report has been produced using speech recognition software.      RIVAS Whelan Jason C, PA  04/09/22 1054

## 2022-10-04 ENCOUNTER — HOSPITAL ENCOUNTER (EMERGENCY)
Facility: HOSPITAL | Age: 82
Discharge: HOME | End: 2022-10-04
Attending: EMERGENCY MEDICINE | Admitting: EMERGENCY MEDICINE

## 2022-10-04 ENCOUNTER — APPOINTMENT (OUTPATIENT)
Dept: GENERAL RADIOLOGY | Facility: HOSPITAL | Age: 82
End: 2022-10-04

## 2022-10-04 VITALS
DIASTOLIC BLOOD PRESSURE: 87 MMHG | HEART RATE: 52 BPM | HEIGHT: 70 IN | BODY MASS INDEX: 24.34 KG/M2 | TEMPERATURE: 97.6 F | OXYGEN SATURATION: 100 % | SYSTOLIC BLOOD PRESSURE: 174 MMHG | RESPIRATION RATE: 16 BRPM | WEIGHT: 170 LBS

## 2022-10-04 DIAGNOSIS — R07.9 CHEST PAIN, UNSPECIFIED TYPE: Primary | ICD-10-CM

## 2022-10-04 DIAGNOSIS — D64.9 ANEMIA, UNSPECIFIED TYPE: ICD-10-CM

## 2022-10-04 LAB
ALBUMIN SERPL-MCNC: 4.4 G/DL (ref 3.5–5.2)
ALBUMIN/GLOB SERPL: 1.5 G/DL
ALP SERPL-CCNC: 81 U/L (ref 39–117)
ALT SERPL W P-5'-P-CCNC: 18 U/L (ref 1–41)
ANION GAP SERPL CALCULATED.3IONS-SCNC: 12 MMOL/L (ref 5–15)
AST SERPL-CCNC: 24 U/L (ref 1–40)
BASOPHILS # BLD AUTO: 0.04 10*3/MM3 (ref 0–0.2)
BASOPHILS NFR BLD AUTO: 0.8 % (ref 0–1.5)
BILIRUB SERPL-MCNC: 0.4 MG/DL (ref 0–1.2)
BUN SERPL-MCNC: 14 MG/DL (ref 8–23)
BUN/CREAT SERPL: 14.9 (ref 7–25)
CALCIUM SPEC-SCNC: 9.5 MG/DL (ref 8.6–10.5)
CHLORIDE SERPL-SCNC: 99 MMOL/L (ref 98–107)
CO2 SERPL-SCNC: 25 MMOL/L (ref 22–29)
CREAT SERPL-MCNC: 0.94 MG/DL (ref 0.76–1.27)
DEPRECATED RDW RBC AUTO: 35.6 FL (ref 37–54)
EGFRCR SERPLBLD CKD-EPI 2021: 79.9 ML/MIN/1.73
EOSINOPHIL # BLD AUTO: 0.11 10*3/MM3 (ref 0–0.4)
EOSINOPHIL NFR BLD AUTO: 2.2 % (ref 0.3–6.2)
ERYTHROCYTE [DISTWIDTH] IN BLOOD BY AUTOMATED COUNT: 15.2 % (ref 12.3–15.4)
GLOBULIN UR ELPH-MCNC: 3 GM/DL
GLUCOSE SERPL-MCNC: 111 MG/DL (ref 65–99)
HCT VFR BLD AUTO: 36 % (ref 37.5–51)
HGB BLD-MCNC: 11.7 G/DL (ref 13–17.7)
HOLD SPECIMEN: NORMAL
IMM GRANULOCYTES # BLD AUTO: 0.03 10*3/MM3 (ref 0–0.05)
IMM GRANULOCYTES NFR BLD AUTO: 0.6 % (ref 0–0.5)
LIPASE SERPL-CCNC: 30 U/L (ref 13–60)
LYMPHOCYTES # BLD AUTO: 0.82 10*3/MM3 (ref 0.7–3.1)
LYMPHOCYTES NFR BLD AUTO: 16.8 % (ref 19.6–45.3)
MCH RBC QN AUTO: 22 PG (ref 26.6–33)
MCHC RBC AUTO-ENTMCNC: 32.5 G/DL (ref 31.5–35.7)
MCV RBC AUTO: 67.8 FL (ref 79–97)
MONOCYTES # BLD AUTO: 0.45 10*3/MM3 (ref 0.1–0.9)
MONOCYTES NFR BLD AUTO: 9.2 % (ref 5–12)
NEUTROPHILS NFR BLD AUTO: 3.44 10*3/MM3 (ref 1.7–7)
NEUTROPHILS NFR BLD AUTO: 70.4 % (ref 42.7–76)
NRBC BLD AUTO-RTO: 0 /100 WBC (ref 0–0.2)
NT-PROBNP SERPL-MCNC: 147.6 PG/ML (ref 0–1800)
PLATELET # BLD AUTO: 249 10*3/MM3 (ref 140–450)
PMV BLD AUTO: 10.5 FL (ref 6–12)
POTASSIUM SERPL-SCNC: 4.3 MMOL/L (ref 3.5–5.2)
PROT SERPL-MCNC: 7.4 G/DL (ref 6–8.5)
QT INTERVAL: 398 MS
QT INTERVAL: 408 MS
QTC INTERVAL: 398 MS
QTC INTERVAL: 400 MS
RBC # BLD AUTO: 5.31 10*6/MM3 (ref 4.14–5.8)
SODIUM SERPL-SCNC: 136 MMOL/L (ref 136–145)
TROPONIN T SERPL-MCNC: <0.01 NG/ML (ref 0–0.03)
TROPONIN T SERPL-MCNC: <0.01 NG/ML (ref 0–0.03)
WBC NRBC COR # BLD: 4.89 10*3/MM3 (ref 3.4–10.8)
WHOLE BLOOD HOLD COAG: NORMAL
WHOLE BLOOD HOLD SPECIMEN: NORMAL

## 2022-10-04 PROCEDURE — 82728 ASSAY OF FERRITIN: CPT | Performed by: NURSE PRACTITIONER

## 2022-10-04 PROCEDURE — 36415 COLL VENOUS BLD VENIPUNCTURE: CPT

## 2022-10-04 PROCEDURE — 83880 ASSAY OF NATRIURETIC PEPTIDE: CPT

## 2022-10-04 PROCEDURE — 84484 ASSAY OF TROPONIN QUANT: CPT

## 2022-10-04 PROCEDURE — 80053 COMPREHEN METABOLIC PANEL: CPT

## 2022-10-04 PROCEDURE — 84484 ASSAY OF TROPONIN QUANT: CPT | Performed by: EMERGENCY MEDICINE

## 2022-10-04 PROCEDURE — 83690 ASSAY OF LIPASE: CPT

## 2022-10-04 PROCEDURE — 83540 ASSAY OF IRON: CPT | Performed by: NURSE PRACTITIONER

## 2022-10-04 PROCEDURE — 84466 ASSAY OF TRANSFERRIN: CPT | Performed by: NURSE PRACTITIONER

## 2022-10-04 PROCEDURE — 99284 EMERGENCY DEPT VISIT MOD MDM: CPT

## 2022-10-04 PROCEDURE — 71045 X-RAY EXAM CHEST 1 VIEW: CPT

## 2022-10-04 PROCEDURE — 85025 COMPLETE CBC W/AUTO DIFF WBC: CPT

## 2022-10-04 PROCEDURE — 93005 ELECTROCARDIOGRAM TRACING: CPT

## 2022-10-04 PROCEDURE — 93005 ELECTROCARDIOGRAM TRACING: CPT | Performed by: EMERGENCY MEDICINE

## 2022-10-04 RX ORDER — ASPIRIN 81 MG/1
324 TABLET, CHEWABLE ORAL ONCE
Status: COMPLETED | OUTPATIENT
Start: 2022-10-04 | End: 2022-10-04

## 2022-10-04 RX ORDER — SODIUM CHLORIDE 0.9 % (FLUSH) 0.9 %
10 SYRINGE (ML) INJECTION AS NEEDED
Status: DISCONTINUED | OUTPATIENT
Start: 2022-10-04 | End: 2022-10-04 | Stop reason: HOSPADM

## 2022-10-04 RX ADMIN — ASPIRIN 81 MG 324 MG: 81 TABLET ORAL at 19:34

## 2022-10-04 NOTE — ED PROVIDER NOTES
[facility]    EMERGENCY DEPARTMENT ENCOUNTER      Pt Name: Gildardo Prajapati  MRN: [id number]  YOB: 1940  Date of evaluation: 10/4/2022  Provider: Dariel Nguyen DO    CHIEF COMPLAINT       Chief Complaint   Patient presents with   • Chest Pain         HISTORY OF PRESENT ILLNESS  (Location/Symptom, Timing/Onset, Context/Setting, Quality, Duration, Modifying Factors, Severity.)   Gildardo Prajapati is a 82 y.o. male who presents to the emergency department for evaluation of intermittent retrosternal chest pain which radiates towards the left chest, left shoulder with some intermittent tingling in the left upper extremity.  Patient notes pain started about 2 hours prior to arrival, 3 hours prior to evaluation in his left chest while at rest.  His pain is about a 4 out of 10, denies any shortness of breath, no nausea vomiting associate with the episode.  Denies any tightness, notes it is mainly a mild sharp pain, has some radiation towards the left upper and medial humeral region.  He denies any chest pain with exertion, no history of known cardiac disease.  He notes he has had a prior stress test versus been many years ago.  He takes medication for blood pressure, Flomax for urinary issues, but states in general and given his age he is relatively healthy.  Is not had any recent illness, he has received his fifth COVID-vaccine, flu vaccine recently and feels this may be lingering effect from that.  Denies any headache, vision changes, no unilateral weakness, numbness or tingling, no difficulty with ambulation.  No neurological complaints.  The patient denies any recent travel, no known sick contacts, no recent illness, no cough or congestion, no fevers or chills, he denies any other acute systemic complaints at this time.      Nursing notes were reviewed.    REVIEW OF SYSTEMS    (2-9 systems for level 4, 10 or more for level 5)   ROS:  General:  No fevers, no chills, no weakness  Cardiovascular:   + Left-sided retrosternal chest pain, no palpitations  Respiratory:  No shortness of breath, no cough, no wheezing  Gastrointestinal:  No pain, no nausea, no vomiting, no diarrhea  Musculoskeletal:  No muscle pain, positive left upper arm pain  Skin:  No rash  Neurologic:  No speech problems, no headache, + intermittent left upper extremity tingling, no extremity numbness, no extremity weakness  Psychiatric:  No anxiety  Genitourinary:  No dysuria, no hematuria    Except as noted above the remainder of the review of systems was reviewed and negative.       PAST MEDICAL HISTORY     Past Medical History:   Diagnosis Date   • Colon polyp 2014 Dr. SRINATH Thomas    2 Polyps   • Diverticulosis 2003 Dr. SRINATH King    1 Polyp   • Gout    • HL (hearing loss) 2016 Dr. Gooden    Diminished   • Hypertension    • Peptic ulceration          SURGICAL HISTORY       Past Surgical History:   Procedure Laterality Date   • APPENDECTOMY     • COLONOSCOPY  2014 Dr. SRINATH Thomas    [facility].   • CYST REMOVAL           CURRENT MEDICATIONS       Current Facility-Administered Medications:   •  sodium chloride 0.9 % flush 10 mL, 10 mL, Intravenous, PRN, Dariel Nguyen, DO    Current Outpatient Medications:   •  metoprolol succinate XL (TOPROL-XL) 25 MG 24 hr tablet, Take 25 mg by mouth Daily., Disp: , Rfl:   •  multivitamin with minerals tablet tablet, Take 1 tablet by mouth Daily., Disp: , Rfl:   •  tamsulosin (FLOMAX) 0.4 MG capsule 24 hr capsule, Take 1 capsule by mouth Daily., Disp: 90 capsule, Rfl: 3    ALLERGIES     Patient has no known allergies.    FAMILY HISTORY       Family History   Problem Relation Age of Onset   • Hypertension Mother    • Hyperlipidemia Mother            • Ulcerative colitis Father    • Colon cancer Neg Hx    • Cancer Neg Hx    • Diabetes Neg Hx           SOCIAL HISTORY       Social History     Socioeconomic History   • Marital status:    Tobacco Use   • Smoking  status: Former Smoker     Packs/day: 1.00     Years: 15.00     Pack years: 15.00     Types: Cigarettes     Start date: 1963     Quit date: 1978     Years since quittin.8   • Smokeless tobacco: Never Used   Vaping Use   • Vaping Use: Never used   Substance and Sexual Activity   • Alcohol use: No   • Drug use: No   • Sexual activity: Not Currently     Partners: Female     Birth control/protection: Abstinence, Post-menopausal         PHYSICAL EXAM    (up to 7 for level 4, 8 or more for level 5)     Vitals:    10/04/22 1856 10/04/22 2017 10/04/22 2018 10/04/22 2019   BP: (!) 187/82 174/74     BP Location: Left arm      Patient Position: Sitting      Pulse: 63  54 55   Resp: 16      Temp:       TempSrc:       SpO2: 100%  100% 97%   Weight:       Height:           Physical Exam  General : Patient is awake, alert, oriented, in no acute distress, nontoxic appearing  HEENT: Pupils are equally round, EOMI, conjunctivae clear, there is no injection no icterus.  Oral mucosa is moist, uvula midline  Neck: Neck is supple, full range of motion, trachea midline  Cardiac: Heart regular rate, rhythm, no murmurs, rubs, or gallops  Lungs: Lungs are clear to auscultation, there is no wheezing, rhonchi, or rales. There is no use of accessory muscles  Chest wall: There is no tenderness to palpation over the chest wall or over ribs  Abdomen: Abdomen is soft, nontender, nondistended. There are no firm or pulsatile masses, no rebound rigidity or guarding  Musculoskeletal: Range of motion of left upper extremity and shoulder is intact, there is no signs of impingement or any obvious musculoskeletal abnormality.  Pulses are strong and equal bilateral upper extremities.  5 out of 5 strength in all 4 extremities.  No focal muscle deficits are appreciated  Neuro: Motor intact, sensory intact, level of consciousness is normal, GCS 15, no focal neurological deficit, no unilateral weakness.  Dermatology: Skin is warm and dry  Psych:  Mentation is grossly normal, cognition is grossly normal. Affect is appropriate      DIAGNOSTIC RESULTS     EKG: All EKGs are interpreted by the Emergency Department Physician who either signs or Co-signs this chart in the absence of a cardiologist.    ECG 12 Lead   Final Result   Test Reason : chest pain   Blood Pressure :   */*   mmHG   Vent. Rate :  58 BPM     Atrial Rate :  58 BPM      P-R Int : 166 ms          QRS Dur :  80 ms       QT Int : 408 ms       P-R-T Axes :  33  -8  18 degrees      QTc Int : 400 ms      Sinus bradycardia   Minimal voltage criteria for LVH, may be normal variant   When compared with ECG of 04-OCT-2022 18:08,   No significant change was found   Confirmed by RENALDO HERNANDEZ MD ([id number]) on 10/4/2022 8:17:18 PM      Referred By: EDMD           Confirmed By: RENALDO HERNANDEZ MD      ECG 12 Lead   Final Result   Test Reason : CP   Blood Pressure :   */*   mmHG   Vent. Rate :  60 BPM     Atrial Rate :  60 BPM      P-R Int : 150 ms          QRS Dur :  80 ms       QT Int : 398 ms       P-R-T Axes :  81  -2  49 degrees      QTc Int : 398 ms      Normal sinus rhythm   Nonspecific ST and T wave abnormality   Abnormal ECG   No previous ECGs available   Confirmed by RENALDO HERNANDEZ MD ([id number]) on 10/4/2022 7:05:01 PM      Referred By:            Confirmed By: RENALDO HERNANDEZ MD      ECG 12 Lead    (Results Pending)       RADIOLOGY:   Non-plain film images such as CT, Ultrasound and MRI are read by the radiologist. Plain radiographic images are visualized and preliminarily interpreted by the emergency physician with the below findings:      [] Radiologist's Report Reviewed:  XR Chest 1 View   Final Result   No active disease.       This report was finalized on 10/4/2022 6:24 PM by Sammy Sanderson MD.                ED BEDSIDE ULTRASOUND:   Performed by ED Physician - none    LABS:    I have reviewed and interpreted all of the currently available lab results from this visit (if applicable):  Results for orders  placed or performed during the hospital encounter of 10/04/22   Troponin    Specimen: Blood   Result Value Ref Range    Troponin T <0.010 0.000 - 0.030 ng/mL   Troponin    Specimen: Blood   Result Value Ref Range    Troponin T <0.010 0.000 - 0.030 ng/mL   Comprehensive Metabolic Panel    Specimen: Blood   Result Value Ref Range    Glucose 111 (H) 65 - 99 mg/dL    BUN 14 8 - 23 mg/dL    Creatinine 0.94 0.76 - 1.27 mg/dL    Sodium 136 136 - 145 mmol/L    Potassium 4.3 3.5 - 5.2 mmol/L    Chloride 99 98 - 107 mmol/L    CO2 25.0 22.0 - 29.0 mmol/L    Calcium 9.5 8.6 - 10.5 mg/dL    Total Protein 7.4 6.0 - 8.5 g/dL    Albumin 4.40 3.50 - 5.20 g/dL    ALT (SGPT) 18 1 - 41 U/L    AST (SGOT) 24 1 - 40 U/L    Alkaline Phosphatase 81 39 - 117 U/L    Total Bilirubin 0.4 0.0 - 1.2 mg/dL    Globulin 3.0 gm/dL    A/G Ratio 1.5 g/dL    BUN/Creatinine Ratio 14.9 7.0 - 25.0    Anion Gap 12.0 5.0 - 15.0 mmol/L    eGFR 79.9 >60.0 mL/min/1.73   Lipase    Specimen: Blood   Result Value Ref Range    Lipase 30 13 - 60 U/L   BNP    Specimen: Blood   Result Value Ref Range    proBNP 147.6 0.0 - 1,800.0 pg/mL   CBC Auto Differential    Specimen: Blood   Result Value Ref Range    WBC 4.89 3.40 - 10.80 10*3/mm3    RBC 5.31 4.14 - 5.80 10*6/mm3    Hemoglobin 11.7 (L) 13.0 - 17.7 g/dL    Hematocrit 36.0 (L) 37.5 - 51.0 %    MCV 67.8 (L) 79.0 - 97.0 fL    MCH 22.0 (L) 26.6 - 33.0 pg    MCHC 32.5 31.5 - 35.7 g/dL    RDW 15.2 12.3 - 15.4 %    RDW-SD 35.6 (L) 37.0 - 54.0 fl    MPV 10.5 6.0 - 12.0 fL    Platelets 249 140 - 450 10*3/mm3    Neutrophil % 70.4 42.7 - 76.0 %    Lymphocyte % 16.8 (L) 19.6 - 45.3 %    Monocyte % 9.2 5.0 - 12.0 %    Eosinophil % 2.2 0.3 - 6.2 %    Basophil % 0.8 0.0 - 1.5 %    Immature Grans % 0.6 (H) 0.0 - 0.5 %    Neutrophils, Absolute 3.44 1.70 - 7.00 10*3/mm3    Lymphocytes, Absolute 0.82 0.70 - 3.10 10*3/mm3    Monocytes, Absolute 0.45 0.10 - 0.90 10*3/mm3    Eosinophils, Absolute 0.11 0.00 - 0.40 10*3/mm3    Basophils,  Absolute 0.04 0.00 - 0.20 10*3/mm3    Immature Grans, Absolute 0.03 0.00 - 0.05 10*3/mm3    nRBC 0.0 0.0 - 0.2 /100 WBC   ECG 12 Lead   Result Value Ref Range    QT Interval 398 ms    QTC Interval 398 ms   ECG 12 Lead   Result Value Ref Range    QT Interval 408 ms    QTC Interval 400 ms   Green Top (Gel)   Result Value Ref Range    Extra Tube Hold for add-ons.    Lavender Top   Result Value Ref Range    Extra Tube hold for add-on    Gold Top - SST   Result Value Ref Range    Extra Tube Hold for add-ons.    Light Blue Top   Result Value Ref Range    Extra Tube Hold for add-ons.         All other labs were within normal range or not returned as of this dictation.      EMERGENCY DEPARTMENT COURSE and DIFFERENTIAL DIAGNOSIS/MDM:   Vitals:    Vitals:    10/04/22 1856 10/04/22 2017 10/04/22 2018 10/04/22 2019   BP: (!) 187/82 174/74     BP Location: Left arm      Patient Position: Sitting      Pulse: 63  54 55   Resp: 16      Temp:       TempSrc:       SpO2: 100%  100% 97%   Weight:       Height:           ED Course as of 10/04/22 2053   Tue Oct 04, 2022   1945 HEART Pathway for Early Discharge in Acute Chest Pain from [facility]  on 10/4/2022  ** All calculations should be rechecked by clinician prior to use **    RESULT SUMMARY:  3 points  HEART Pathway Score    Low risk  0.9-1.7% 30-day MACE    Repeat troponin at 3 hours and if negative, discharge home with outpatient follow-up.      INPUTS:  History -> 0 = Slightly suspicious  EKG -> 0 = Normal  Age -> 2 = =63  Risk factors -> 1 = 1-2 risk factors  Initial troponin -> 0 = =normal limit   [AP]      ED Course User Index  [AP] Dariel Nguyen,        This is a very pleasant 82-year-old male who has had retrosternal mild chest pain with some radiation towards the left upper extremity approximately 3 hours, very mild in nature but waxing waning in severity.  He is nontoxic-appearing during my evaluation, has underlying hypertension and states he has elevated blood  pressures when he is at the doctor's office, blood pressure 187/82, vital signs are otherwise stable.  We did obtain IV, labs, image including EKG which does not reveal any acute underlying ischemic changes.  Chest x-ray stable, we will obtain repeat troponin, EKG.   Heart score 3.  Repeat troponin, EKG with no acute ischemic change or abnormalities appreciated.  We discussed inpatient versus outpatient work-up, heart score 3, low risk nonzero risk, could do an observation versus referral over to our heart valve chest pain clinic in the next few days.  The patient would prefer to be at home, states he currently does not have any chest pain, understands the need for strict return precautions and the need for further work-up and evaluation.  He verbally understands the return precautions discussed.    I had a discussion with the patient/family regarding diagnosis, diagnostic results, treatment plan, and medications.  The patient/family indicated understanding of these instructions.  I spent adequate time at the bedside preceding discharge necessary to personally discuss the aftercare instructions, giving patient education, providing explanations of the results of our evaluations/findings, and my decision making to assure that the patient/family understand the plan of care.  Time was allotted to answer questions at that time and throughout the ED course.  Emphasis was placed on timely follow-up after discharge.  I also discussed the potential for the development of an acute emergent condition requiring further evaluation, admission, or even surgical intervention. I discussed that we found nothing during the visit today indicating the need for further workup, admission, or the presence of an unstable medical condition.  I encouraged the patient to return to the emergency department immediately for ANY concerns, worsening, new complaints, or if symptoms persist and unable to seek follow-up in a timely fashion.  The  patient/family expressed understanding and agreement with this plan.  The patient will follow-up with their PCP in 1-2 days for reevaluation.       MEDICATIONS ADMINISTERED IN ED:  Medications   sodium chloride 0.9 % flush 10 mL (has no administration in time range)   aspirin chewable tablet 324 mg (324 mg Oral Given 10/4/22 1934)       PROCEDURES:  Procedures    CRITICAL CARE TIME    Total Critical Care time was 0 minutes, excluding separately reportable procedures.   There was a high probability of clinically significant/life threatening deterioration in the patient's condition which required my urgent intervention.      FINAL IMPRESSION      1. Chest pain, unspecified type          DISPOSITION/PLAN     ED Disposition     ED Disposition   Discharge    Condition   Stable    Comment   --             PATIENT REFERRED TO:  Wilbur Quinones MD  [address] [address]  [phone]    In 2 days      [facility] Emergency Department  [address] [address] [phone]    If symptoms worsen    [facility] CARDIOLOGY  [address]  [address]  [facility] [address] [phone]          DISCHARGE MEDICATIONS:     Medication List      CONTINUE taking these medications    metoprolol succinate XL 25 MG 24 hr tablet  Commonly known as: TOPROL-XL     multivitamin with minerals tablet tablet     tamsulosin 0.4 MG capsule 24 hr capsule  Commonly known as: FLOMAX  Take 1 capsule by mouth Daily.                Comment: Please note this report has been produced using speech recognition software.      Dariel Nguyen DO  Attending Emergency Physician               Dariel Nguyen DO  10/04/22 4299

## 2022-10-06 ENCOUNTER — OFFICE VISIT (OUTPATIENT)
Dept: FAMILY MEDICINE CLINIC | Facility: CLINIC | Age: 82
End: 2022-10-06

## 2022-10-06 VITALS
RESPIRATION RATE: 18 BRPM | HEART RATE: 59 BPM | DIASTOLIC BLOOD PRESSURE: 72 MMHG | OXYGEN SATURATION: 98 % | WEIGHT: 176 LBS | HEIGHT: 70 IN | BODY MASS INDEX: 25.2 KG/M2 | SYSTOLIC BLOOD PRESSURE: 144 MMHG

## 2022-10-06 DIAGNOSIS — R09.89 ABDOMINAL BRUIT: ICD-10-CM

## 2022-10-06 DIAGNOSIS — D64.9 ANEMIA, UNSPECIFIED TYPE: Primary | ICD-10-CM

## 2022-10-06 DIAGNOSIS — Z87.891 HX OF SMOKING: ICD-10-CM

## 2022-10-06 DIAGNOSIS — Z87.11 HISTORY OF GASTRIC ULCER: ICD-10-CM

## 2022-10-06 DIAGNOSIS — R07.9 CHEST PAIN OF UNCERTAIN ETIOLOGY: ICD-10-CM

## 2022-10-06 DIAGNOSIS — D50.9 MICROCYTIC ANEMIA: ICD-10-CM

## 2022-10-06 PROBLEM — D56.3 THALASSEMIA MINOR: Status: ACTIVE | Noted: 2022-10-06

## 2022-10-06 LAB
FERRITIN SERPL-MCNC: 518.7 NG/ML (ref 30–400)
IRON 24H UR-MRATE: 44 MCG/DL (ref 59–158)
IRON SATN MFR SERPL: 14 % (ref 20–50)
TIBC SERPL-MCNC: 310 MCG/DL (ref 298–536)
TRANSFERRIN SERPL-MCNC: 208 MG/DL (ref 200–360)

## 2022-10-06 PROCEDURE — 99214 OFFICE O/P EST MOD 30 MIN: CPT | Performed by: NURSE PRACTITIONER

## 2022-10-06 RX ORDER — FAMOTIDINE 40 MG/1
40 TABLET, FILM COATED ORAL DAILY
COMMUNITY
End: 2022-10-14

## 2022-10-06 NOTE — PROGRESS NOTES
Subjective   Gildardo Prajapati is a 82 y.o. male.   Chief Complaint   Patient presents with   • Follow-up     Hospital f/u on 10/4/22 for chest pn. Pt c/o of a little pressure.       History of Present Illness   On day he went to ER he did not feel well, had a sharp pain in his left chest that radiated to left arm, happened 3 times with 1/2 hour between; BP at that time was 180/56; so wife took him.   Since ED has had some chest pressure, but no longer radiates to arm, ER referred to cardiology  Has hx of ulcers and colon polyps, denies heart burn. Does not drink any alcohol, or smoke, hx of thalassemia, diagnosed about 50 years ago by hematologist.   Quit smoking over 40 years ago.  The following portions of the patient's history were reviewed and updated as appropriate: allergies, current medications, past family history, past medical history, past social history, past surgical history and problem list.    Review of Systems   Constitutional: Negative for appetite change, chills, fatigue, fever and unexpected weight change.   Eyes: Negative for visual disturbance.   Respiratory: Negative for shortness of breath.    Cardiovascular: Positive for chest pain (sharp pain that radiated to arm ER visit). Negative for palpitations and leg swelling.   Gastrointestinal: Negative for abdominal pain, blood in stool, constipation, diarrhea and nausea.   Genitourinary: Negative for difficulty urinating and dysuria.   Neurological: Positive for light-headedness (if stands quickly). Negative for dizziness, syncope and headaches.       Objective   Physical Exam  Vitals reviewed.   Constitutional:       General: He is not in acute distress.     Appearance: Normal appearance. He is not ill-appearing, toxic-appearing or diaphoretic.   HENT:      Head: Normocephalic and atraumatic.   Neck:      Vascular: No carotid bruit.   Cardiovascular:      Rate and Rhythm: Normal rate and regular rhythm.      Heart sounds: Normal heart sounds.  "No murmur heard.  Pulmonary:      Effort: No respiratory distress.      Breath sounds: Normal breath sounds. No wheezing.   Abdominal:      General: Bowel sounds are normal. There is no distension.      Palpations: Abdomen is soft.      Tenderness: There is no abdominal tenderness. There is no guarding or rebound.      Comments: Possible faint abdominal bruit   Skin:     Coloration: Skin is pale.   Neurological:      Mental Status: He is alert.       /72 (BP Location: Right arm, Patient Position: Sitting, Cuff Size: Adult)   Pulse 59   Resp 18   Ht 177.8 cm (70\")   Wt 79.8 kg (176 lb)   SpO2 98%   BMI 25.25 kg/m²     Assessment & Plan   Diagnoses and all orders for this visit:    1. Anemia, unspecified type (Primary)  -     Iron Profile; Future  -     Ferritin; Future    2. History of gastric ulcer    3. Microcytic anemia    4. Hx of smoking  -     US AAA Screen Limited; Future    5. Abdominal bruit  -     US AAA Screen Limited; Future    6. Chest pain of uncertain etiology      Will add iron and ferritin to labs drawn in ER. Microcytic anemia may be from thalassemia, but with history of gastric ulcer and low hemoglobin do not want to assume.  Schedule follow up with Dr. Thomas, EGD and colonoscopy    Schedule with cardiologist for further evaluation of chest pain and possible bruit; AAA US ordered  Follow up with Dr. Quinones  Pt is aware of reasons to seek emergent care including chest pain, shortness of breath, or other worsening symptoms  Patient was encouraged to keep me informed of any acute changes, lack of improvement, or any new concerning symptoms.    I spent a total of 40 minutes before, during and after the visit reviewing records, updating history and care gaps, educating the patient about his/her condition, ordering labs and prescriptions.          "

## 2022-10-06 NOTE — PATIENT INSTRUCTIONS
Schedule follow up with Dr. Thomas, EGD and colonoscopy    Schedule with cardiologist    Follow up with Dr. Quinones  
(4) no impairment

## 2022-10-10 ENCOUNTER — OFFICE VISIT (OUTPATIENT)
Dept: FAMILY MEDICINE CLINIC | Facility: CLINIC | Age: 82
End: 2022-10-10

## 2022-10-10 VITALS
HEART RATE: 72 BPM | OXYGEN SATURATION: 98 % | BODY MASS INDEX: 24.94 KG/M2 | DIASTOLIC BLOOD PRESSURE: 82 MMHG | TEMPERATURE: 98.1 F | SYSTOLIC BLOOD PRESSURE: 146 MMHG | WEIGHT: 174.2 LBS | HEIGHT: 70 IN

## 2022-10-10 DIAGNOSIS — E61.1 IRON DEFICIENCY: Primary | ICD-10-CM

## 2022-10-10 DIAGNOSIS — R07.89 OTHER CHEST PAIN: ICD-10-CM

## 2022-10-10 DIAGNOSIS — R10.13 EPIGASTRIC PAIN: ICD-10-CM

## 2022-10-10 PROCEDURE — 99214 OFFICE O/P EST MOD 30 MIN: CPT | Performed by: INTERNAL MEDICINE

## 2022-10-10 RX ORDER — PANTOPRAZOLE SODIUM 40 MG/1
40 TABLET, DELAYED RELEASE ORAL DAILY
Qty: 30 TABLET | Refills: 3 | Status: SHIPPED | OUTPATIENT
Start: 2022-10-10 | End: 2022-10-25

## 2022-10-10 NOTE — PROGRESS NOTES
"Gildardo Prajapati  1940  [id number]  Patient Care Team:  Wilbur Quinones MD as PCP - General (Internal Medicine)    Gildardo Prajapati is a 82 y.o. male here today for follow up.     This patient is accompanied by their self who contributes to the history of their care.    Chief Complaint:    Chief Complaint   Patient presents with   • Abdominal Pain     Pt. States his Upper Stomach is hurting         History of Present Illness:  I have reviewed and/or updated the patient's past medical, past surgical, family, social history, problem list and allergies as appropriate.     This gentleman has history of gastric ulcer or duodenal ulcer.  Was also seen in the emergency room on 10/4/2022 with chest pain.  He was found to be anemic and has an upcoming appointment with GI.  He is also seeing cardiology.  He returns to the office today complaining of upper abdominal pain no nausea vomiting.  No melena.  No blood or blood per stool.  There is been no further radiation of pain to his shoulder.  He has been on famotidine 40 mg daily.  No NSAID use.  He denies any lightheadedness with standing.    Review of Systems   Constitutional: Negative.    HENT: Negative.    Cardiovascular: Positive for chest pain.   Gastrointestinal: Positive for abdominal pain.   Endocrine: Negative.        Vitals:    10/10/22 1210   BP: 146/82   BP Location: Left arm   Patient Position: Sitting   Cuff Size: Adult   Pulse: 72   Temp: 98.1 °F (36.7 °C)   SpO2: 98%   Weight: 79 kg (174 lb 3.2 oz)   Height: 177.8 cm (70\")   PainSc:   4   PainLoc: Abdomen     Body mass index is 25 kg/m².    Physical Exam  Vitals and nursing note reviewed.   Constitutional:       General: He is not in acute distress.     Appearance: He is well-developed. He is not diaphoretic.   HENT:      Head: Normocephalic and atraumatic.      Right Ear: External ear normal.      Left Ear: External ear normal.      Mouth/Throat:      Pharynx: No oropharyngeal exudate.   Eyes: "      General: No scleral icterus.        Right eye: No discharge.      Conjunctiva/sclera: Conjunctivae normal.   Neck:      Thyroid: No thyromegaly.      Vascular: No JVD.      Trachea: No tracheal deviation.   Cardiovascular:      Rate and Rhythm: Normal rate and regular rhythm.      Heart sounds: Normal heart sounds.      Comments: PMI nondisplaced  Pulmonary:      Effort: Pulmonary effort is normal.      Breath sounds: Normal breath sounds. No wheezing or rales.   Abdominal:      General: Bowel sounds are normal.      Palpations: Abdomen is soft.      Tenderness: There is abdominal tenderness. There is no guarding or rebound.      Comments: There is significant epigastric tenderness to palpation.   Musculoskeletal:      Cervical back: Normal range of motion and neck supple.      Comments: Normal gait   Lymphadenopathy:      Cervical: No cervical adenopathy.   Skin:     General: Skin is warm and dry.      Capillary Refill: Capillary refill takes less than 2 seconds.      Coloration: Skin is not pale.      Findings: No rash.   Neurological:      Mental Status: He is alert and oriented to person, place, and time.      Motor: No abnormal muscle tone.      Coordination: Coordination normal.   Psychiatric:         Judgment: Judgment normal.         Procedures    Results Review:    I reviewed the patient's new clinical results.     Assessment/Plan:    Problem List Items Addressed This Visit        Cardiac and Vasculature    Other chest pain       Gastrointestinal Abdominal     Epigastric pain   Other Visit Diagnoses     Iron deficiency    -  Primary    Relevant Orders    Ambulatory referral for Screening EGD      I suspect this patient is at least gastritis possible hiatal hernia with Andres's ulcer versus recurrent of ulcer.  Chest pain however does need to be addressed.  Is slightly iron deficient in the face of thalassemia.  I recommended EGD as well as advancing his acid suppression to Protonix 40 mg daily.   Emergency room if he develops bright red blood or melena.    Plan of care reviewed with patient at the conclusion of today's visit. Education was provided regarding diagnosis and management.  Patient verbalizes understanding of and agreement with management plan.    No follow-ups on file.    Wilbur Quinones MD      Please note than portions of this note were completed wt a Voice Recognition Program

## 2022-10-13 ENCOUNTER — APPOINTMENT (OUTPATIENT)
Dept: CARDIOLOGY | Facility: HOSPITAL | Age: 82
End: 2022-10-13

## 2022-10-13 ENCOUNTER — OFFICE VISIT (OUTPATIENT)
Dept: CARDIOLOGY | Facility: HOSPITAL | Age: 82
End: 2022-10-13

## 2022-10-13 VITALS
OXYGEN SATURATION: 98 % | BODY MASS INDEX: 24.91 KG/M2 | HEIGHT: 70 IN | SYSTOLIC BLOOD PRESSURE: 168 MMHG | RESPIRATION RATE: 17 BRPM | DIASTOLIC BLOOD PRESSURE: 73 MMHG | HEART RATE: 56 BPM | TEMPERATURE: 97.1 F | WEIGHT: 174 LBS

## 2022-10-13 DIAGNOSIS — R00.2 PALPITATIONS: ICD-10-CM

## 2022-10-13 DIAGNOSIS — R07.9 CHEST PAIN, UNSPECIFIED TYPE: Primary | ICD-10-CM

## 2022-10-13 DIAGNOSIS — R42 DIZZINESS: ICD-10-CM

## 2022-10-13 DIAGNOSIS — R10.13 EPIGASTRIC PAIN: ICD-10-CM

## 2022-10-13 DIAGNOSIS — I10 PRIMARY HYPERTENSION: ICD-10-CM

## 2022-10-13 PROCEDURE — 99214 OFFICE O/P EST MOD 30 MIN: CPT | Performed by: NURSE PRACTITIONER

## 2022-10-13 NOTE — PROGRESS NOTES
"[facility], [facility]    Chief Complaint  Chest Pain    Subjective    History of Present Illness {CC  Problem List  Visit  Diagnosis   Encounters  Notes  Medications  Labs  Result Review Imaging  Media :23}     Gildardo Prajapati presents to [facility] CARDIOLOGY for   History of Present Illness     82-year-old male with GERD, Hypertension, BPH, iron deficiency, history of gastric ulcer.    Patient has been complaining of upper abdominal pain. Hx of gastritis, gastric ulcer approximately 2 years ago.  No nausea, vomiting, melena.      Patient presented to [facility] ED on 10/4/2022 with new chest pain radiating to left shoulder and tingling of left upper extremity.  Not necessarily noted with exertion.  No dyspnea.  Patient does complain of occasional palpitations after meals, dizziness with rapid change in position.  No near syncope, syncope, edema.    Patient walks routinely, 3 times a week 1 to 2 miles.  He has noted some decreased activity tolerance in the last few months    Patient has plan for follow-up with GI.  Currently on PPI (new med).    Monitors home blood pressure regularly.  Home blood pressure log 130s to 140s.    Cardiac risk factors:  History of  hypertension, diabetes.  Tobacco use (remote),  gender, age (older than 55 for men, 65 for women)      Objective     Vital Signs:   Vitals:    10/13/22 0839 10/13/22 0842 10/13/22 0844   BP: (!) 197/75 157/71 168/73   BP Location: Right arm Left arm Left arm   Patient Position: Sitting Standing Sitting   Cuff Size: Adult Adult Adult   Pulse: 54 66 56   Resp:   17   Temp:   97.1 °F (36.2 °C)   TempSrc:   Temporal   SpO2: 99% 98% 98%   Weight:   78.9 kg (174 lb)   Height:   177.8 cm (70\")     Body mass index is 24.97 kg/m².  Physical Exam  Vitals reviewed.   Constitutional:       General: He is not in acute distress.     Appearance: Normal appearance.   Cardiovascular:      " Rate and Rhythm: Normal rate and regular rhythm.      Pulses:           Radial pulses are 2+ on the right side.        Dorsalis pedis pulses are 2+ on the right side.        Posterior tibial pulses are 2+ on the right side.      Heart sounds: Normal heart sounds.   Pulmonary:      Effort: Pulmonary effort is normal.      Breath sounds: Normal breath sounds.   Abdominal:      Palpations: Abdomen is soft.      Tenderness: There is no abdominal tenderness.   Musculoskeletal:      Right lower leg: No edema.      Left lower leg: No edema.   Skin:     General: Skin is warm and dry.   Neurological:      Mental Status: He is alert.   Psychiatric:         Mood and Affect: Mood normal.         Behavior: Behavior is cooperative.              Result Review  Data Reviewed:{ Labs  Result Review  Imaging  Med Tab  Media :23}     EKG sinus bradycardia 58 bpm    Chest x-ray 10/14/2022: No active chest disease.    Admission on 10/04/2022, Discharged on 10/04/2022   Component Date Value Ref Range Status   • QT Interval 10/04/2022 398  ms Final   • QTC Interval 10/04/2022 398  ms Final   • QT Interval 10/04/2022 408  ms Final   • QTC Interval 10/04/2022 400  ms Final   • Troponin T 10/04/2022 <0.010  0.000 - 0.030 ng/mL Final   • Troponin T 10/04/2022 <0.010  0.000 - 0.030 ng/mL Final   • Glucose 10/04/2022 111 (H)  65 - 99 mg/dL Final   • BUN 10/04/2022 14  8 - 23 mg/dL Final   • Creatinine 10/04/2022 0.94  0.76 - 1.27 mg/dL Final   • Sodium 10/04/2022 136  136 - 145 mmol/L Final   • Potassium 10/04/2022 4.3  3.5 - 5.2 mmol/L Final    Slight hemolysis detected by analyzer. Results may be affected.   • Chloride 10/04/2022 99  98 - 107 mmol/L Final   • CO2 10/04/2022 25.0  22.0 - 29.0 mmol/L Final   • Calcium 10/04/2022 9.5  8.6 - 10.5 mg/dL Final   • Total Protein 10/04/2022 7.4  6.0 - 8.5 g/dL Final   • Albumin 10/04/2022 4.40  3.50 - 5.20 g/dL Final   • ALT (SGPT) 10/04/2022 18  1 - 41 U/L Final   • AST (SGOT) 10/04/2022 24  1 -  40 U/L Final   • Alkaline Phosphatase 10/04/2022 81  39 - 117 U/L Final   • Total Bilirubin 10/04/2022 0.4  0.0 - 1.2 mg/dL Final   • Globulin 10/04/2022 3.0  gm/dL Final    Calculated Result   • A/G Ratio 10/04/2022 1.5  g/dL Final   • BUN/Creatinine Ratio 10/04/2022 14.9  7.0 - 25.0 Final   • Anion Gap 10/04/2022 12.0  5.0 - 15.0 mmol/L Final   • eGFR 10/04/2022 79.9  >60.0 mL/min/1.73 Final    National Kidney Foundation and American Society of Nephrology (ASN) Task Force recommended calculation based on the Chronic Kidney Disease Epidemiology Collaboration (CKD-EPI) equation refit without adjustment for race.   • Lipase 10/04/2022 30  13 - 60 U/L Final   • proBNP 10/04/2022 147.6  0.0 - 1,800.0 pg/mL Final   • Extra Tube 10/04/2022 Hold for add-ons.   Final    Auto resulted.   • Extra Tube 10/04/2022 hold for add-on   Final    Auto resulted   • Extra Tube 10/04/2022 Hold for add-ons.   Final    Auto resulted.   • Extra Tube 10/04/2022 Hold for add-ons.   Final    Auto resulted.   • Extra Tube 10/04/2022 Hold for add-ons.   Final    Auto resulted   • WBC 10/04/2022 4.89  3.40 - 10.80 10*3/mm3 Final   • RBC 10/04/2022 5.31  4.14 - 5.80 10*6/mm3 Final   • Hemoglobin 10/04/2022 11.7 (L)  13.0 - 17.7 g/dL Final   • Hematocrit 10/04/2022 36.0 (L)  37.5 - 51.0 % Final   • MCV 10/04/2022 67.8 (L)  79.0 - 97.0 fL Final   • MCH 10/04/2022 22.0 (L)  26.6 - 33.0 pg Final   • MCHC 10/04/2022 32.5  31.5 - 35.7 g/dL Final   • RDW 10/04/2022 15.2  12.3 - 15.4 % Final   • RDW-SD 10/04/2022 35.6 (L)  37.0 - 54.0 fl Final   • MPV 10/04/2022 10.5  6.0 - 12.0 fL Final   • Platelets 10/04/2022 249  140 - 450 10*3/mm3 Final   • Neutrophil % 10/04/2022 70.4  42.7 - 76.0 % Final   • Lymphocyte % 10/04/2022 16.8 (L)  19.6 - 45.3 % Final   • Monocyte % 10/04/2022 9.2  5.0 - 12.0 % Final   • Eosinophil % 10/04/2022 2.2  0.3 - 6.2 % Final   • Basophil % 10/04/2022 0.8  0.0 - 1.5 % Final   • Immature Grans % 10/04/2022 0.6 (H)  0.0 - 0.5 %  Final   • Neutrophils, Absolute 10/04/2022 3.44  1.70 - 7.00 10*3/mm3 Final   • Lymphocytes, Absolute 10/04/2022 0.82  0.70 - 3.10 10*3/mm3 Final   • Monocytes, Absolute 10/04/2022 0.45  0.10 - 0.90 10*3/mm3 Final   • Eosinophils, Absolute 10/04/2022 0.11  0.00 - 0.40 10*3/mm3 Final   • Basophils, Absolute 10/04/2022 0.04  0.00 - 0.20 10*3/mm3 Final   • Immature Grans, Absolute 10/04/2022 0.03  0.00 - 0.05 10*3/mm3 Final   • nRBC 10/04/2022 0.0  0.0 - 0.2 /100 WBC Final   • Iron 10/04/2022 44 (L)  59 - 158 mcg/dL Final   • Iron Saturation 10/04/2022 14 (L)  20 - 50 % Final   • Transferrin 10/04/2022 208  200 - 360 mg/dL Final   • TIBC 10/04/2022 310  298 - 536 mcg/dL Final   • Ferritin 10/04/2022 518.70 (H)  30.00 - 400.00 ng/mL Final                   Assessment and Plan {CC Problem List  Visit Diagnosis  ROS  Review (Popup)  Health Maintenance  Quality  BestPractice  Medications  SmartSets  SnapShot Encounters  Media :23}   1. Chest pain, unspecified type  Cardiac risk factors:  History of  hypertension, diabetes.  Tobacco use (remote),  gender, age (older than 55 for men, 65 for women)    No recent ischemic eval    - Stress Test With Myocardial Perfusion (1 Day); gated  Test instructions given and reviewed    2. Primary hypertension  Elevated today  Home log reported 130-140's  Continue BB    Hold BB the day of stress test.     3. Palpitations  Monitor at this time.  But if chest discomfort continues and palpitations worsen or change will need to consider cardiac heart monitor.    - Adult Transthoracic Echo Complete W/ Cont if Necessary Per Protocol; Future    4. Dizziness  Noted more with rapid position changes.  No near syncope, syncope.  Continue to monitor at this time.    - Adult Transthoracic Echo Complete W/ Cont if Necessary Per Protocol; Future    5. Epigastric pain  Complete ultrasound AAA screening as ordered by PCP  Follow-up with GI  Continue PPI          Follow Up {Instructions Charge  Capture  Follow-up Communications :23}   Return in about 6 weeks (around 11/24/2022) for Video visit, palpitations, Chest pain.    Patient was given instructions and counseling regarding his condition or for health maintenance advice. Please see specific information pulled into the AVS if appropriate.  Patient was instructed to call the [facility] with any questions, concerns, or worsening symptoms.

## 2022-10-14 ENCOUNTER — OFFICE VISIT (OUTPATIENT)
Dept: GASTROENTEROLOGY | Facility: CLINIC | Age: 82
End: 2022-10-14

## 2022-10-14 VITALS
SYSTOLIC BLOOD PRESSURE: 142 MMHG | OXYGEN SATURATION: 98 % | HEART RATE: 68 BPM | WEIGHT: 175.8 LBS | DIASTOLIC BLOOD PRESSURE: 82 MMHG | HEIGHT: 70 IN | BODY MASS INDEX: 25.17 KG/M2 | TEMPERATURE: 97.3 F

## 2022-10-14 DIAGNOSIS — D64.9 ANEMIA, UNSPECIFIED TYPE: ICD-10-CM

## 2022-10-14 DIAGNOSIS — R10.13 EPIGASTRIC PAIN: Primary | ICD-10-CM

## 2022-10-14 DIAGNOSIS — R79.89 ELEVATED FERRITIN: ICD-10-CM

## 2022-10-14 DIAGNOSIS — K31.A0 INTESTINAL METAPLASIA OF STOMACH: ICD-10-CM

## 2022-10-14 DIAGNOSIS — K44.9 HIATAL HERNIA: ICD-10-CM

## 2022-10-14 DIAGNOSIS — K26.9 DUODENAL ULCER: ICD-10-CM

## 2022-10-14 DIAGNOSIS — Z86.010 HISTORY OF COLONIC POLYPS: ICD-10-CM

## 2022-10-14 DIAGNOSIS — Z87.19 HISTORY OF GASTRITIS: ICD-10-CM

## 2022-10-14 DIAGNOSIS — R94.8 ABNORMAL BILIARY HIDA SCAN: ICD-10-CM

## 2022-10-14 DIAGNOSIS — K57.90 DIVERTICULOSIS: ICD-10-CM

## 2022-10-14 PROCEDURE — 99214 OFFICE O/P EST MOD 30 MIN: CPT | Performed by: NURSE PRACTITIONER

## 2022-10-14 NOTE — PROGRESS NOTES
GASTROENTEROLOGY OFFICE NOTE    Gildardo Prajapati  [id number]  1940    CARE TEAM  Patient Care Team:  Wilbur Quinones MD as PCP - General (Internal Medicine)    Referring Provider: No ref. provider found    Chief Complaint   Patient presents with   • GI Problem        HISTORY OF PRESENT ILLNESS:   Gildardo Prajapati is a 82 y.o. male who presents to the clinic today to discussed EGD that is scheduled in 3 days with Dr Thomas as he reports he has not received information regarding upcoming appointment.     Review of record revealed the office received referral on 10/10/2022 for EGD due to iron deficiency.  He was evaluated urgent treatment 10/4/2022 and recommended to go to the emergency department for additional evaluation due to chest pain.  He was discharged with diagnosis of chest pain with recommendation to follow-up with his primary care provider.      Review of record reveals lower than normal hemoglobin at 12.1 on 2/23/2022 and 10/4/2022 hemoglobin 11.7.  Iron profile with low iron of 44, iron saturation low at 14, transferrin normal 208 and TIBC normal 310.  Ferritin elevated at 518.7 (patient reports he has read an article that revealed ferritin may be elevated following flu shot and he had flu shot 1 day prior to labs being drawn.      Review of documentation on 10/6/2022 revealed patient has chest pressure, history of ulcers and colon polyps, history of thalassemia diagnosed about 50 years ago by hematology.   Patient reports he had previous evaluation in [place]. Documentation that iron and ferritin would be added to the labs in the ER, microcytic anemia possibly from thalassemia but with history of gastric ulcer and low hemoglobin do not want to assume, schedule follow-up with Dr. Thomas, EGD and colonoscopy.  Documented concern by Dr. Quinones on 10/10/2022 that patient may have gastritis, possible hiatal hernia with Andres's lesions versus recurrent of ulcer, EGD  recommended and change acid medication to pantoprazole 40 mg daily. patient reports compliance with taking pantoprazole.  He reports occasional use of Pepto-Bismol, once every 2 months or so but no other identified nonsteroidal anti-inflammatory drug use.  He also reports he does not smoke, drink and tries to avoid fried foods.    He reports upper abdominal discomfort for at least 1 year.    He has daily bowel movement.    He reports prior to urgent treatment center visit that led to emergency room visit he had pain in the left upper abdominal area that radiated to his left chest and arm.    11/6/2019 by Dr. Thomas, history of abnormal HIDA scan (HIDA scan on 10/10/2019 revealed gallbladder ejection fraction of 18% suggestive of biliary dyskinesia/gallbladder dysfunction) and RUQ abdominal pain for which EGD was done .  EGD revealed erosive gastritis, hiatal hernia and multiple duodenal ulcerations likely from aspirin recommendations to await pathology report, start omeprazole 40 mg daily and treat H. pylori if present.  Biopsy of the duodenum without abnormality, gastric biopsy revealed focal chronic active gastritis, focal intestinal metaplasia and otherwise was without abnormality, biopsy of the esophagus revealed mild chronic gastritis.    11/18/2020 colonoscopy per Dr. Thomas with documentation of history of polyps (11/2014 with 2 polyps removed), diverticulosis revealed 2 polyps of rectum, polyp of transverse colon, 2 polyps of ascending colon, diverticulosis, repeat colonoscopy suggested if all are adenomas and screening is desired in 3 years.  Right colon polyp tubular adenoma, transverse colon polyp tubular adenoma and polyp at 12 cm hyperplastic polyp and tubular adenoma.  3-year repeat suggested if desired.      Past Medical History:   Diagnosis Date   • Colon polyp 11/5/2014 Dr. SRINATH Thomas    2 Polyps   • Diverticulosis 8/11/2003 Dr. SRINATH King    1 Polyp   • Gout    • History of stomach ulcers    • HL  "(hearing loss) 2016  Speech    Diminished   • Hypertension    • Peptic ulceration         Past Surgical History:   Procedure Laterality Date   • APPENDECTOMY     • COLONOSCOPY  2014 Dr. SRINATH Thomas    [facility]; 2020 Dr. Thomas repeat 3 years   • CYST REMOVAL     • UPPER GASTROINTESTINAL ENDOSCOPY  2019    EGD Dr. Hensley        Current Outpatient Medications on File Prior to Visit   Medication Sig   • metoprolol succinate XL (TOPROL-XL) 25 MG 24 hr tablet Take 25 mg by mouth Daily.   • multivitamin with minerals tablet tablet Take 1 tablet by mouth Daily.   • pantoprazole (PROTONIX) 40 MG EC tablet Take 1 tablet by mouth Daily.   • tamsulosin (FLOMAX) 0.4 MG capsule 24 hr capsule Take 1 capsule by mouth Daily.   • [DISCONTINUED] famotidine (PEPCID) 40 MG tablet Take 40 mg by mouth Daily.     No current facility-administered medications on file prior to visit.       No Known Allergies    Family History   Problem Relation Age of Onset   • Hypertension Mother    • Hyperlipidemia Mother            • Ulcerative colitis Father    • Colon cancer Neg Hx    • Cancer Neg Hx    • Diabetes Neg Hx        Social History     Socioeconomic History   • Marital status:    Tobacco Use   • Smoking status: Former     Packs/day: 1.00     Years: 15.00     Pack years: 15.00     Types: Cigarettes     Start date: 1963     Quit date: 1978     Years since quittin.8   • Smokeless tobacco: Never   Vaping Use   • Vaping Use: Never used   Substance and Sexual Activity   • Alcohol use: Not Currently     Alcohol/week: 2.0 standard drinks     Types: 2 Glasses of wine per week   • Drug use: Never   • Sexual activity: Not Currently     Partners: Female     Birth control/protection: Abstinence, Post-menopausal       PHYSICAL EXAM   /82 (BP Location: Left arm, Patient Position: Sitting, Cuff Size: Adult)   Pulse 68   Temp 97.3 °F (36.3 °C) (Infrared)   Ht 177.8 cm (70\")   Wt 79.7 kg " (175 lb 12.8 oz)   SpO2 98%   BMI 25.22 kg/m²   Physical Exam    Results Review:  11/6/2019 by Dr. Thomas, history of abnormal HIDA scan (HIDA scan on 10/10/2019 revealed gallbladder ejection fraction of 18% suggestive of biliary dyskinesia/gallbladder dysfunction) and RUQ abdominal pain for which EGD was done .  EGD revealed erosive gastritis, hiatal hernia and multiple duodenal ulcerations likely from aspirin recommendations to await pathology report, start omeprazole 40 mg daily and treat H. pylori if present.  Biopsy of the duodenum without abnormality, gastric biopsy revealed focal chronic active gastritis, focal intestinal metaplasia and otherwise was without abnormality, biopsy of the esophagus revealed mild chronic gastritis.    11/18/2020 colonoscopy per Dr. Thomas with documentation of history of polyps (11/2014 with 2 polyps removed), diverticulosis revealed 2 polyps of rectum, polyp of transverse colon, 2 polyps of ascending colon, diverticulosis, repeat colonoscopy suggested if all are adenomas and screening is desired in 3 years.  Right colon polyp tubular adenoma, transverse colon polyp tubular adenoma and polyp at 12 cm hyperplastic polyp and tubular adenoma.  3-year repeat suggested if desired.    CMP    CMP 2/23/22 10/4/22   Glucose 101 (A) 111 (A)   BUN 13 14   Creatinine 0.97 0.94   eGFR Non African Am 74    Sodium 139 136   Potassium 4.3 4.3   Chloride 104 99   Calcium 9.8 9.5   Albumin 4.40 4.40   Total Bilirubin 0.8 0.4   Alkaline Phosphatase 84 81   AST (SGOT) 22 24   ALT (SGPT) 21 18   (A) Abnormal value       Comments are available for some flowsheets but are not being displayed.           CBC    CBC 2/23/22 10/4/22   WBC 5.54 4.89   RBC 5.66 5.31   Hemoglobin 12.1 (A) 11.7 (A)   Hematocrit 40.2 36.0 (A)   MCV 71.0 (A) 67.8 (A)   MCH 21.4 (A) 22.0 (A)   MCHC 30.1 (A) 32.5   RDW 16.6 (A) 15.2   Platelets 280 249   (A) Abnormal value            Component Ref Range & Units 10/4/2022   Ferritin  30.00 - 400.00 ng/mL 518.70 High       Component Ref Range & Units 10/4/2022   Iron 59 - 158 mcg/dL 44 Low     Iron Saturation 20 - 50 % 14 Low     Transferrin 200 - 360 mg/dL 208    TIBC 298 - 536 mcg/dL 310    Resulting Agency  [facility]         ASSESSMENT / PLAN  1. Epigastric pain  -Proceed with EGD as scheduled with Dr. Thomas in 3 days, instructions and location reviewed with patient in the office today  -Continue pantoprazole 40 mg daily, recommend 30 minutes to 1 hour prior to a meal  -Try to avoid Pepto-Bismol  -Continue to avoid NSAIDs, patient denied alcohol use    2. History of gastritis  3. Duodenal ulcer  4. Hiatal hernia  - EGD in 2019 per Dr. Thomas revealed erosive gastritis, hiatal hernia and multiple duodenal ulcerations   -Continue pantoprazole daily  -Repeat EGD is scheduled    5. Intestinal metaplasia of stomach  -Identified on biopsies from EGD in 2019, plan for repeat EGD as scheduled  -Patient did mention that at time of prior colonoscopy Dr. Thomas mentioned consideration for upper endoscopy at time of repeat colonoscopy, proceed with EGD in 3 days    6. Anemia, unspecified type  7. Elevated ferritin  -Patient has low iron on iron profile but elevated ferritin, plan for EGD as scheduled, repeat labs at follow-up visit, patient reported and review of documentation revealed history of thalassemia, he may need evaluation by hematology in the future but plan to wait results of EGD and likely repeat labs in the future    8. History of colonic polyps  9. Diverticulosis  -Consider surveillance colonoscopy 11/2023 as previously suggested by Dr. Thomas if patient desires continued surveillance  -High-fiber diet may be helpful  10. Abnormal biliary HIDA scan  - HIDA in 2019 with GB ejection fraction of 18%, biliary dyskinesia possibly contributing to discomfort in upper abdomen but unknown at this time, consider referral to surgeon in the future, proceed with EGD as scheduled      Return in about 3 days  (around 10/17/2022) for Follow-up after EGD.    Rosa Levin, APRN  10/14/2022

## 2022-10-17 ENCOUNTER — APPOINTMENT (OUTPATIENT)
Dept: ULTRASOUND IMAGING | Facility: HOSPITAL | Age: 82
End: 2022-10-17

## 2022-10-17 ENCOUNTER — OUTSIDE FACILITY SERVICE (OUTPATIENT)
Dept: GASTROENTEROLOGY | Facility: CLINIC | Age: 82
End: 2022-10-17

## 2022-10-17 PROCEDURE — 43239 EGD BIOPSY SINGLE/MULTIPLE: CPT | Performed by: INTERNAL MEDICINE

## 2022-10-20 ENCOUNTER — HOSPITAL ENCOUNTER (OUTPATIENT)
Dept: CARDIOLOGY | Facility: HOSPITAL | Age: 82
Discharge: HOME | End: 2022-10-20

## 2022-10-20 ENCOUNTER — HOSPITAL ENCOUNTER (OUTPATIENT)
Dept: ULTRASOUND IMAGING | Facility: HOSPITAL | Age: 82
Discharge: HOME | End: 2022-10-20

## 2022-10-20 ENCOUNTER — APPOINTMENT (OUTPATIENT)
Dept: ULTRASOUND IMAGING | Facility: HOSPITAL | Age: 82
End: 2022-10-20

## 2022-10-20 VITALS — BODY MASS INDEX: 25.15 KG/M2 | HEIGHT: 70 IN | WEIGHT: 175.71 LBS

## 2022-10-20 DIAGNOSIS — Z87.891 HX OF SMOKING: ICD-10-CM

## 2022-10-20 DIAGNOSIS — R42 DIZZINESS: ICD-10-CM

## 2022-10-20 DIAGNOSIS — R00.2 PALPITATIONS: ICD-10-CM

## 2022-10-20 DIAGNOSIS — R09.89 ABDOMINAL BRUIT: ICD-10-CM

## 2022-10-20 LAB
BH CV ECHO MEAS - AI P1/2T: 636.4 MSEC
BH CV ECHO MEAS - AO MAX PG: 5 MMHG
BH CV ECHO MEAS - AO MEAN PG: 3 MMHG
BH CV ECHO MEAS - AO ROOT DIAM: 3.5 CM
BH CV ECHO MEAS - AO V2 MAX: 112 CM/SEC
BH CV ECHO MEAS - AO V2 VTI: 27 CM
BH CV ECHO MEAS - AVA(I,D): 2.6 CM2
BH CV ECHO MEAS - EDV(CUBED): 110.6 ML
BH CV ECHO MEAS - EDV(MOD-SP2): 101 ML
BH CV ECHO MEAS - EDV(MOD-SP4): 112 ML
BH CV ECHO MEAS - EF(MOD-BP): 62.8 %
BH CV ECHO MEAS - EF(MOD-SP2): 60.7 %
BH CV ECHO MEAS - EF(MOD-SP4): 63.9 %
BH CV ECHO MEAS - ESV(CUBED): 29.8 ML
BH CV ECHO MEAS - ESV(MOD-SP2): 39.7 ML
BH CV ECHO MEAS - ESV(MOD-SP4): 40.4 ML
BH CV ECHO MEAS - FS: 35.4 %
BH CV ECHO MEAS - IVS/LVPW: 1.13 CM
BH CV ECHO MEAS - IVSD: 0.9 CM
BH CV ECHO MEAS - LA DIMENSION: 3.4 CM
BH CV ECHO MEAS - LAT PEAK E' VEL: 9.4 CM/SEC
BH CV ECHO MEAS - LV DIASTOLIC VOL/BSA (35-75): 56.8 CM2
BH CV ECHO MEAS - LV MASS(C)D: 137.1 GRAMS
BH CV ECHO MEAS - LV MAX PG: 3.2 MMHG
BH CV ECHO MEAS - LV MEAN PG: 2 MMHG
BH CV ECHO MEAS - LV SYSTOLIC VOL/BSA (12-30): 20.5 CM2
BH CV ECHO MEAS - LV V1 MAX: 89.1 CM/SEC
BH CV ECHO MEAS - LV V1 VTI: 22.2 CM
BH CV ECHO MEAS - LVIDD: 4.8 CM
BH CV ECHO MEAS - LVIDS: 3.1 CM
BH CV ECHO MEAS - LVOT AREA: 3.1 CM2
BH CV ECHO MEAS - LVOT DIAM: 2 CM
BH CV ECHO MEAS - LVPWD: 0.8 CM
BH CV ECHO MEAS - MED PEAK E' VEL: 7.5 CM/SEC
BH CV ECHO MEAS - MV A MAX VEL: 117 CM/SEC
BH CV ECHO MEAS - MV DEC SLOPE: 330 CM/SEC2
BH CV ECHO MEAS - MV DEC TIME: 0.25 MSEC
BH CV ECHO MEAS - MV E MAX VEL: 83.8 CM/SEC
BH CV ECHO MEAS - MV E/A: 0.72
BH CV ECHO MEAS - PA ACC TIME: 0.09 SEC
BH CV ECHO MEAS - PA PR(ACCEL): 39.4 MMHG
BH CV ECHO MEAS - PI END-D VEL: 119 CM/SEC
BH CV ECHO MEAS - SI(MOD-SP2): 31.1 ML/M2
BH CV ECHO MEAS - SI(MOD-SP4): 36.3 ML/M2
BH CV ECHO MEAS - SV(LVOT): 69.7 ML
BH CV ECHO MEAS - SV(MOD-SP2): 61.3 ML
BH CV ECHO MEAS - SV(MOD-SP4): 71.6 ML
BH CV ECHO MEAS - TAPSE (>1.6): 1.96 CM
BH CV ECHO MEAS - TR MAX PG: 9.2 MMHG
BH CV ECHO MEAS - TR MAX VEL: 152 CM/SEC
BH CV ECHO MEASUREMENTS AVERAGE E/E' RATIO: 9.92
BH CV VAS BP LEFT ARM: NORMAL MMHG
BH CV XLRA - RV BASE: 3 CM
BH CV XLRA - RV LENGTH: 7.8 CM
BH CV XLRA - RV MID: 2.8 CM
BH CV XLRA - TDI S': 13.2 CM/SEC
LEFT ATRIUM VOLUME INDEX: 29.9 ML/M2
LV EF 2D ECHO EST: 63 %
MAXIMAL PREDICTED HEART RATE: 136 BPM
STRESS TARGET HR: 116 BPM

## 2022-10-20 PROCEDURE — 93306 TTE W/DOPPLER COMPLETE: CPT

## 2022-10-20 PROCEDURE — 93306 TTE W/DOPPLER COMPLETE: CPT | Performed by: INTERNAL MEDICINE

## 2022-10-20 PROCEDURE — 76706 US ABDL AORTA SCREEN AAA: CPT

## 2022-10-21 NOTE — PROGRESS NOTES
Your echocardiogram results have been reviewed.  Your heart muscle function is normal.  There is no concerning valvular heart disease.  Overall your results are considered acceptable.

## 2022-10-24 ENCOUNTER — HOSPITAL ENCOUNTER (OUTPATIENT)
Dept: CARDIOLOGY | Facility: HOSPITAL | Age: 82
Discharge: HOME | End: 2022-10-24
Admitting: NURSE PRACTITIONER

## 2022-10-24 VITALS
BODY MASS INDEX: 25.15 KG/M2 | HEART RATE: 64 BPM | SYSTOLIC BLOOD PRESSURE: 122 MMHG | WEIGHT: 175.71 LBS | HEIGHT: 70 IN | DIASTOLIC BLOOD PRESSURE: 88 MMHG

## 2022-10-24 DIAGNOSIS — R07.9 CHEST PAIN, UNSPECIFIED TYPE: ICD-10-CM

## 2022-10-24 LAB
BH CV REST NUCLEAR ISOTOPE DOSE: 9 MCI
BH CV STRESS BP STAGE 1: NORMAL
BH CV STRESS BP STAGE 2: NORMAL
BH CV STRESS DURATION MIN STAGE 1: 3
BH CV STRESS DURATION MIN STAGE 2: 3
BH CV STRESS DURATION MIN STAGE 3: 0
BH CV STRESS DURATION SEC STAGE 1: 0
BH CV STRESS DURATION SEC STAGE 2: 0
BH CV STRESS DURATION SEC STAGE 3: 44
BH CV STRESS GRADE STAGE 1: 10
BH CV STRESS GRADE STAGE 2: 12
BH CV STRESS GRADE STAGE 3: 14
BH CV STRESS HR STAGE 1: 99
BH CV STRESS HR STAGE 2: 116
BH CV STRESS HR STAGE 3: 123
BH CV STRESS METS STAGE 1: 5
BH CV STRESS METS STAGE 2: 7.5
BH CV STRESS METS STAGE 3: 10
BH CV STRESS NUCLEAR ISOTOPE DOSE: 31.3 MCI
BH CV STRESS O2 STAGE 1: 97
BH CV STRESS O2 STAGE 2: 97
BH CV STRESS O2 STAGE 3: 98
BH CV STRESS PROTOCOL 1: NORMAL
BH CV STRESS RECOVERY BP: NORMAL MMHG
BH CV STRESS RECOVERY HR: 78 BPM
BH CV STRESS RECOVERY O2: 99 %
BH CV STRESS SPEED STAGE 1: 1.7
BH CV STRESS SPEED STAGE 2: 2.5
BH CV STRESS SPEED STAGE 3: 3.4
BH CV STRESS STAGE 1: 1
BH CV STRESS STAGE 2: 2
BH CV STRESS STAGE 3: 3
LV EF NUC BP: 81 %
MAXIMAL PREDICTED HEART RATE: 136 BPM
PERCENT MAX PREDICTED HR: 90.44 %
STRESS BASELINE BP: NORMAL MMHG
STRESS BASELINE HR: 60 BPM
STRESS O2 SAT REST: 99 %
STRESS PERCENT HR: 106 %
STRESS POST ESTIMATED WORKLOAD: 7.5 METS
STRESS POST EXERCISE DUR MIN: 6 MIN
STRESS POST EXERCISE DUR SEC: 44 SEC
STRESS POST O2 SAT PEAK: 98 %
STRESS POST PEAK BP: NORMAL MMHG
STRESS POST PEAK HR: 123 BPM
STRESS TARGET HR: 116 BPM

## 2022-10-24 PROCEDURE — 93017 CV STRESS TEST TRACING ONLY: CPT

## 2022-10-24 PROCEDURE — 78452 HT MUSCLE IMAGE SPECT MULT: CPT

## 2022-10-24 PROCEDURE — 78452 HT MUSCLE IMAGE SPECT MULT: CPT | Performed by: INTERNAL MEDICINE

## 2022-10-24 PROCEDURE — A9500 TC99M SESTAMIBI: HCPCS | Performed by: NURSE PRACTITIONER

## 2022-10-24 PROCEDURE — 93018 CV STRESS TEST I&R ONLY: CPT | Performed by: INTERNAL MEDICINE

## 2022-10-24 PROCEDURE — 0 TECHNETIUM SESTAMIBI: Performed by: NURSE PRACTITIONER

## 2022-10-24 RX ADMIN — TECHNETIUM TC 99M SESTAMIBI 1 DOSE: 1 INJECTION INTRAVENOUS at 12:30

## 2022-10-24 RX ADMIN — TECHNETIUM TC 99M SESTAMIBI 1 DOSE: 1 INJECTION INTRAVENOUS at 14:15

## 2022-10-25 ENCOUNTER — OFFICE VISIT (OUTPATIENT)
Dept: CARDIOLOGY | Facility: CLINIC | Age: 82
End: 2022-10-25

## 2022-10-25 VITALS
DIASTOLIC BLOOD PRESSURE: 68 MMHG | HEART RATE: 96 BPM | SYSTOLIC BLOOD PRESSURE: 132 MMHG | WEIGHT: 174 LBS | HEIGHT: 71 IN | BODY MASS INDEX: 24.36 KG/M2 | OXYGEN SATURATION: 99 %

## 2022-10-25 DIAGNOSIS — R94.39 ABNORMAL MYOCARDIAL PERFUSION STUDY: Primary | ICD-10-CM

## 2022-10-25 DIAGNOSIS — I25.119 CORONARY ARTERY DISEASE INVOLVING NATIVE CORONARY ARTERY OF NATIVE HEART WITH ANGINA PECTORIS: ICD-10-CM

## 2022-10-25 PROCEDURE — 99204 OFFICE O/P NEW MOD 45 MIN: CPT | Performed by: INTERNAL MEDICINE

## 2022-10-25 RX ORDER — FAMOTIDINE 20 MG/1
TABLET, FILM COATED ORAL DAILY
COMMUNITY
Start: 2020-10-05 | End: 2022-11-23

## 2022-10-25 NOTE — PROGRESS NOTES
Subjective:     Encounter Date:10/25/2022    Primary Care Physician: Wilbur Qiunones MD      Patient ID: Gildardo Prajapati is a 82 y.o. male.    Chief Complaint:Chest pain, unspecified type    PROBLEM LIST:  1. Abnormal MPS  a. 10/24/2022 MPS symptomatic angina with 4 mm ST downsloping depression noted at peak exercise.  Lasted 6 minutes into recovery.  Normal perfusion study no evidence of ischemia but severely abnormal stress EKG.  EF greater than 70%.  2. Hypertension  3. Remote tobacco   4. Gout  5. Diverticulosis  6. GERD  7. BPH   8. Surgeries:  a. Appendectomy  b. Cyst removal, pilonidal   c. Tonsillectomy       No Known Allergies      Current Outpatient Medications:   •  famotidine (PEPCID) 20 MG tablet, Daily., Disp: , Rfl:   •  metoprolol succinate XL (TOPROL-XL) 25 MG 24 hr tablet, Take 25 mg by mouth Daily., Disp: , Rfl:   •  multivitamin with minerals tablet tablet, Take 1 tablet by mouth Daily., Disp: , Rfl:   •  tamsulosin (FLOMAX) 0.4 MG capsule 24 hr capsule, Take 1 capsule by mouth Daily., Disp: 90 capsule, Rfl: 3        History of Present Illness    Patient is an 82-year-old  male who we are seeing today for further evaluation of abnormal myocardial perfusion study.  He has no previous history of coronary disease.  He does have hypertension.  Patient notes that he was in his usual state of health until October 4.  At that time he developed some severe midsternal chest pain.  Since that time he has had a couple of episodes of lower midsternal discomfort.  He presented to his primary care physician who thought that this was possibly indigestion related and changed his Pepcid to Protonix.  Patient notes that he was intolerant to Protonix in the past and switch back to his Pepcid.  He is typically active and walks roughly a mile a day.  Has not noted any new anginal symptoms other than the recurrent chest pain.  To further evaluate his symptoms he underwent a myocardial perfusion study  with markedly abnormal EKG as noted above.  Due to this he was referred for further evaluation.    The following portions of the patient's history were reviewed and updated as appropriate: allergies, current medications, past family history, past medical history, past social history, past surgical history and problem list.    Family History   Problem Relation Age of Onset   • Hypertension Mother    • Hyperlipidemia Mother            • Ulcerative colitis Father    • Anemia Father            • Colon cancer Neg Hx    • Cancer Neg Hx    • Diabetes Neg Hx        Social History     Tobacco Use   • Smoking status: Former     Packs/day: 1.00     Years: 15.00     Pack years: 15.00     Types: Cigarettes     Start date: 1963     Quit date: 1978     Years since quittin.8   • Smokeless tobacco: Never   Vaping Use   • Vaping Use: Never used   Substance Use Topics   • Alcohol use: Not Currently     Alcohol/week: 2.0 standard drinks     Types: 2 Glasses of wine per week   • Drug use: Never         Review of Systems   Constitutional: Negative for fever and malaise/fatigue.   HENT: Negative for nosebleeds.    Eyes: Negative for redness and visual disturbance.   Cardiovascular: Negative for orthopnea, palpitations and paroxysmal nocturnal dyspnea.   Respiratory: Negative for cough, snoring, sputum production and wheezing.    Hematologic/Lymphatic: Negative for bleeding problem.   Skin: Negative for flushing, itching and rash.   Musculoskeletal: Negative for falls, joint pain and muscle cramps.   Gastrointestinal: Positive for heartburn. Negative for abdominal pain, diarrhea, nausea and vomiting.   Genitourinary: Negative for hematuria.   Neurological: Negative for excessive daytime sleepiness, dizziness, headaches, tremors and weakness.   Psychiatric/Behavioral: Negative for substance abuse. The patient is not nervous/anxious.           Objective:   /68 (BP Location: Left arm, Patient Position:  "Sitting)   Pulse 96   Ht 180.3 cm (71\")   Wt 78.9 kg (174 lb)   SpO2 99%   BMI 24.27 kg/m²         Vitals reviewed.   Constitutional:       Appearance: Healthy appearance. Well-developed and not in distress.   Eyes:      Conjunctiva/sclera: Conjunctivae normal.      Pupils: Pupils are equal, round, and reactive to light.   HENT:      Head: Normocephalic and atraumatic.    Mouth/Throat:      Pharynx: Oropharynx is clear.   Neck:      Thyroid: Thyroid normal. No thyromegaly.      Vascular: Normal carotid pulses. No carotid bruit or JVD. JVD normal.      Lymphadenopathy: No cervical adenopathy.   Pulmonary:      Effort: No respiratory distress.      Breath sounds: No wheezing. No rales.   Chest:      Chest wall: Not tender to palpatation.   Cardiovascular:      Normal rate. Regular rhythm.      Murmurs: There is a grade 1/6 systolic murmur at the LLSB.      No gallop.   Pulses:     Carotid: 2+ bilaterally.     Dorsalis pedis: 2+ bilaterally.     Posterior tibial: 2+ bilaterally.  Abdominal:      General: There is no distension or abdominal bruit.      Palpations: There is no abdominal mass.      Tenderness: There is no abdominal tenderness. There is no rebound.   Musculoskeletal:         General: No tenderness or deformity.      Extremities: No clubbing present.Skin:     General: Skin is warm and dry. There is no cyanosis.      Findings: No rash.   Neurological:      Mental Status: Alert, oriented to person, place, and time and oriented to person, place and time.         Procedures          Assessment:   Assessment & Plan      Diagnoses and all orders for this visit:    1. Abnormal myocardial perfusion study (Primary)  -     Case Request Cath Lab: Left Heart Cath    2. Coronary artery disease involving native coronary artery of native heart with angina pectoris (HCC)      1.  Abnormal exercise myocardial fusion study with symptomatic 4 mm of ST depression and borderline ST elevation in aVL.  Despite this, his " perfusion images were normal without transient ischemic dilatation.  2.  Coronary artery disease, known atherosclerosis of the aorta.  3.  History of peptic ulcer disease/gastric ulcer, suspect was nonsteroidal anti-inflammatory induced.  Have resolved now these been off aspirin for 2 years.  4.  Unknown lipid status    Recommendations:  1.  Discussed options with patient.  We discussed the possibility of both false positive and false negative stress test, and the high likelihood given his EKG changes that he may have multivessel disease/balanced ischemia.  2.  We have recommended left heart catheterization plus or minus revascularization as necessary.  Patient is to discuss it with his wife and schedule at his earliest convenience.  3.  We will not start aspirin at this time given his history of gastric ulcers due to NSAIDs.  He understands we may need to restart this if he has PCI.  If that occurs, he will also likely need PPI temporarily.  4.  We will schedule cardiac cath soon as patient is agreeable.       Rosa MORAN scribed portions of this dictation for Dr. Karan Jackson.   I have seen and examined the patient, I have reviewed the note, discussed the case with the advance practice clinician, made necessary changes and I agree with the final note.    Karan Jackson MD  10/25/22  14:26 EDT        Dictated utilizing Dragon dictation

## 2022-10-25 NOTE — PROGRESS NOTES
Your myocardial perfusion stress test results have been reviewed.  Your results are acceptable.  No evidence of a heart blockage affecting blood flow to your heart muscle.  You have a normal heart muscle strength.  Again your results are considered acceptable.

## 2022-11-04 ENCOUNTER — PATIENT MESSAGE (OUTPATIENT)
Dept: FAMILY MEDICINE CLINIC | Facility: CLINIC | Age: 82
End: 2022-11-04

## 2022-11-04 RX ORDER — METOPROLOL SUCCINATE 25 MG/1
25 TABLET, EXTENDED RELEASE ORAL DAILY
Qty: 90 TABLET | Refills: 3 | Status: SHIPPED | OUTPATIENT
Start: 2022-11-04

## 2022-11-04 NOTE — TELEPHONE ENCOUNTER
Rx Refill Note  Requested Prescriptions     Pending Prescriptions Disp Refills   • metoprolol succinate XL (TOPROL-XL) 25 MG 24 hr tablet       Sig: Take 1 tablet by mouth Daily.      Last office visit with prescribing clinician: 10/10/2022      Next office visit with prescribing clinician: 3/8/2023 }  Theresa Shields MA  11/04/22, 11:03 EDT     Historical Provider

## 2022-11-07 RX ORDER — TAMSULOSIN HYDROCHLORIDE 0.4 MG/1
CAPSULE ORAL
Qty: 90 CAPSULE | Refills: 1 | Status: SHIPPED | OUTPATIENT
Start: 2022-11-07

## 2022-11-07 NOTE — TELEPHONE ENCOUNTER
Rx Refill Note  Requested Prescriptions     Pending Prescriptions Disp Refills   • tamsulosin (FLOMAX) 0.4 MG capsule 24 hr capsule [Pharmacy Med Name: TAMSULOSIN HCL 0.4 MG CAPSULE] 90 capsule 3     Sig: TAKE ONE CAPSULE BY MOUTH DAILY      Last office visit with prescribing clinician: 10/10/2022      Next office visit with prescribing clinician: 3/8/2023            Rossana Garcia MA  11/07/22, 14:02 EST

## 2022-11-11 ENCOUNTER — PATIENT MESSAGE (OUTPATIENT)
Dept: FAMILY MEDICINE CLINIC | Facility: CLINIC | Age: 82
End: 2022-11-11

## 2022-11-14 ENCOUNTER — OFFICE VISIT (OUTPATIENT)
Dept: GASTROENTEROLOGY | Facility: CLINIC | Age: 82
End: 2022-11-14

## 2022-11-14 VITALS
WEIGHT: 173.6 LBS | BODY MASS INDEX: 24.3 KG/M2 | TEMPERATURE: 97.3 F | SYSTOLIC BLOOD PRESSURE: 153 MMHG | HEIGHT: 71 IN | DIASTOLIC BLOOD PRESSURE: 60 MMHG | HEART RATE: 62 BPM

## 2022-11-14 DIAGNOSIS — Z86.010 HISTORY OF COLONIC POLYPS: ICD-10-CM

## 2022-11-14 DIAGNOSIS — K44.9 HIATAL HERNIA: Primary | ICD-10-CM

## 2022-11-14 DIAGNOSIS — K57.90 DIVERTICULOSIS: ICD-10-CM

## 2022-11-14 DIAGNOSIS — R94.8 ABNORMAL BILIARY HIDA SCAN: ICD-10-CM

## 2022-11-14 DIAGNOSIS — D64.9 ANEMIA, UNSPECIFIED TYPE: ICD-10-CM

## 2022-11-14 DIAGNOSIS — R07.9 CHEST PAIN, UNSPECIFIED TYPE: ICD-10-CM

## 2022-11-14 PROCEDURE — 99214 OFFICE O/P EST MOD 30 MIN: CPT | Performed by: NURSE PRACTITIONER

## 2022-11-14 NOTE — PROGRESS NOTES
GASTROENTEROLOGY OFFICE NOTE    Gildardo Prajapati  [id number]  1940    CARE TEAM  Patient Care Team:  Wilbur Quinones MD as PCP - General (Internal Medicine)  Karan Jackson MD as Consulting Physician (Cardiology)  Rosa Lux APRN as Nurse Practitioner (Cardiology)    Referring Provider: No ref. provider found    Chief Complaint   Patient presents with   • Follow-up    after EGD    HISTORY OF PRESENT ILLNESS:   Gildardo Prajapati is a 82 y.o. male who Returns for 1 month follow-up for EGD due to anemia and chest pain, history of ulcers, history of thalassemia, upper abdominal discomfort.      Recommendation at last office visit to proceed with EGD as previously scheduled.      10/17/2022 EGD per Dr. Thomas revealed irregular Z-line at the GE junction, small hiatal hernia.  Recommendation to take pantoprazole 40 mg twice daily and await pathology results.  Biopsy of the duodenum without abnormality and biopsy of the GE junction revealed mild nonspecific chronic inflammation and negative for intestinal metaplasia.     He had labs drawn on 11/3/2022 as below with improvement in anemia. He brought a copy of the results to the office today.      He discontinued pantoprazole 40 mg twice daily due to feeling as though pantoprazole irritated his stomach.     He is doing okay and was aware of results of EGD as above. He continues to have intermittent chest pain.     Past Medical History:   Diagnosis Date   • Abnormal ECG 8/18/2016 [facility] [place] [place]    Fat on Liver   • Colon polyp 11/5/2014 Dr. SRINATH Thomas    2 Polyps   • Diverticulosis 8/11/2003 Dr. SRINATH King    1 Polyp   • Gout    • History of stomach ulcers    • HL (hearing loss) 8/23/2016 Dr. Gooden    Diminished   • Hypertension    • Peptic ulceration         Past Surgical History:   Procedure Laterality Date   • APPENDECTOMY     • COLONOSCOPY  11/5/2014 Dr. SRINATH Thomas    [facility]. [place]; 11/18/2020 Dr. Thomas repeat 3 years   • CYST REMOVAL    "  • UPPER GASTROINTESTINAL ENDOSCOPY  2019    EGD Dr. Hensley        Current Outpatient Medications on File Prior to Visit   Medication Sig   • metoprolol succinate XL (TOPROL-XL) 25 MG 24 hr tablet Take 1 tablet by mouth Daily.   • multivitamin with minerals tablet tablet Take 1 tablet by mouth Daily.   • tamsulosin (FLOMAX) 0.4 MG capsule 24 hr capsule TAKE ONE CAPSULE BY MOUTH DAILY     No current facility-administered medications on file prior to visit.       No Known Allergies    Family History   Problem Relation Age of Onset   • Hypertension Mother    • Hyperlipidemia Mother            • Ulcerative colitis Father    • Anemia Father            • Colon cancer Neg Hx    • Cancer Neg Hx    • Diabetes Neg Hx        Social History     Socioeconomic History   • Marital status:    Tobacco Use   • Smoking status: Former     Packs/day: 1.00     Years: 15.00     Pack years: 15.00     Types: Cigarettes     Start date: 1963     Quit date: 1978     Years since quittin.9   • Smokeless tobacco: Never   Vaping Use   • Vaping Use: Never used   Substance and Sexual Activity   • Alcohol use: Not Currently     Alcohol/week: 2.0 standard drinks     Types: 2 Glasses of wine per week   • Drug use: Never   • Sexual activity: Not Currently     Partners: Female     Birth control/protection: Abstinence, Post-menopausal       PHYSICAL EXAM   /60 (BP Location: Left arm, Patient Position: Sitting, Cuff Size: Adult)   Pulse 62   Temp 97.3 °F (36.3 °C) (Infrared)   Ht 180.3 cm (71\")   Wt 78.7 kg (173 lb 9.6 oz)   BMI 24.21 kg/m²   Physical Exam  Constitutional:       General: He is not in acute distress.     Appearance: He is not toxic-appearing.   HENT:      Head: Normocephalic and atraumatic. No contusion.      Right Ear: External ear normal.      Left Ear: External ear normal.   Eyes:      General: Lids are normal. No scleral icterus.        Right eye: No discharge.         Left eye: No " discharge.      Extraocular Movements: Extraocular movements intact.   Neck:      Trachea: Trachea normal.      Comments: No visible mass  No visible adenopathy  Cardiovascular:      Rate and Rhythm: Normal rate.   Pulmonary:      Effort: No respiratory distress.      Comments: Symmetrical expansion    Abdominal:      Palpations: Abdomen is soft. There is no mass.      Tenderness: There is no abdominal tenderness.   Musculoskeletal:      Right lower leg: No edema.      Left lower leg: No edema.      Comments: Symmetrical movement of upper extremities  Symmetrical movement of lower extremities  No visible deformities   Skin:     General: Skin is warm and dry.      Coloration: Skin is not jaundiced.   Neurological:      General: No focal deficit present.      Mental Status: He is alert and oriented to person, place, and time.   Psychiatric:         Mood and Affect: Mood normal.         Behavior: Behavior normal.         Thought Content: Thought content normal.     Results Review:  11/6/2019 by Dr. Thomas, history of abnormal HIDA scan (HIDA scan on 10/10/2019 revealed gallbladder ejection fraction of 18% suggestive of biliary dyskinesia/gallbladder dysfunction) and RUQ abdominal pain for which EGD was done .  EGD revealed erosive gastritis, hiatal hernia and multiple duodenal ulcerations likely from aspirin recommendations to await pathology report, start omeprazole 40 mg daily and treat H. pylori if present.  Biopsy of the duodenum without abnormality, gastric biopsy revealed focal chronic active gastritis, focal intestinal metaplasia and otherwise was without abnormality, biopsy of the esophagus revealed mild chronic gastritis.     11/18/2020 colonoscopy per Dr. Thomas with documentation of history of polyps (11/2014 with 2 polyps removed), diverticulosis revealed 2 polyps of rectum, polyp of transverse colon, 2 polyps of ascending colon, diverticulosis, repeat colonoscopy suggested if all are adenomas and screening  is desired in 3 years.  Right colon polyp tubular adenoma, transverse colon polyp tubular adenoma and polyp at 12 cm hyperplastic polyp and tubular adenoma.  3-year repeat suggested if desired.    10/17/2022 EGD per Dr. Thomas revealed irregular Z-line at the GE junction, small hiatal hernia.  Recommendation to take pantoprazole 40 mg twice daily and await pathology results.  Biopsy of the duodenum without abnormality and biopsy of the GE junction revealed mild nonspecific chronic inflammation and negative for intestinal metaplasia.     11/3/2022 CBC with normal white blood cell count 4.7, hemoglobin slightly low but improved at 12.4, hematocrit normal 41.5 and platelet count normal 270.  Iron 148.  Ferritin 358.    ASSESSMENT / PLAN  1. Hiatal hernia  - pantoprazole 40 mg twice daily reportedly irritated his stomach  - see treatment of chest pain as below  2. Anemia, unspecified type  - hemoglobin improved  - history of thalassemia, he may need evaluation by hematology in the future   3. History of colonic polyps  4. Diverticulosis  -Consider surveillance colonoscopy 11/2023 as previously suggested by Dr. Thomas if patient desires continued surveillance  -High-fiber diet may be helpful    5. Abnormal biliary HIDA scan  - HIDA in 2019 with GB ejection fraction of 18%, biliary dyskinesia possibly contributing to discomfort in upper abdomen but unknown at this time, consider referral to surgeon in the future, proceed with EGD as scheduled    6. Chest pain  - in the event esophageal spasm contributes to chest pain, consider 2 Altoid mints prior to meals  - consider lansoprazole, omeprazole, rabeprazole, esomeprazole to determine if chest pain improves due to report of pantoprazole irritating stomach  Return in about 6 weeks (around 12/26/2022).    Rosa Levin, APRN  11/14/2022

## 2022-11-14 NOTE — PATIENT INSTRUCTIONS
Consider trying 2 altoid mints 30 minutes prior to meals to determine if abnormal sensation in chest stops.     We can consider starting a different daily PPI since pantoprazole was not well tolerated in the past:   Lansoprazole (Prevacid)  Omeprazole (Prilosec)  Rabeprazole (Aciphex)  Esomeprazole (Nexium) (this is the medication I would start with first over the counter, take 30 minutes prior to a meal, consider prior to lunch)

## 2022-11-23 ENCOUNTER — TELEMEDICINE (OUTPATIENT)
Dept: CARDIOLOGY | Facility: HOSPITAL | Age: 82
End: 2022-11-23

## 2022-11-23 VITALS
WEIGHT: 170 LBS | HEART RATE: 61 BPM | BODY MASS INDEX: 23.8 KG/M2 | DIASTOLIC BLOOD PRESSURE: 52 MMHG | SYSTOLIC BLOOD PRESSURE: 140 MMHG | HEIGHT: 71 IN

## 2022-11-23 DIAGNOSIS — R07.9 CHEST PAIN, UNSPECIFIED TYPE: Primary | ICD-10-CM

## 2022-11-23 DIAGNOSIS — I10 PRIMARY HYPERTENSION: ICD-10-CM

## 2022-11-23 DIAGNOSIS — R00.2 PALPITATIONS: ICD-10-CM

## 2022-11-23 PROCEDURE — 99214 OFFICE O/P EST MOD 30 MIN: CPT | Performed by: NURSE PRACTITIONER

## 2022-11-23 RX ORDER — FAMOTIDINE 40 MG/1
40 TABLET, FILM COATED ORAL DAILY
COMMUNITY

## 2022-11-23 NOTE — PROGRESS NOTES
"[facility], [facility]    This was an audio and video enabled telemedicine encounter.    You have chosen to receive care through the use of telemedicine. Telemedicine enables health care providers at different locations to provide safe, effective, and convenient care through the use of technology. As with any health care service, there are risks associated with the use of telemedicine, including equipment failure, poor connections, and  issues.    • Do you understand the risks and benefits of telemedicine as I have explained them to you? Yes  • Have your questions regarding telemedicine been answered? Yes  • Do you consent to the use of telemedicine in your medical care today? Yes    Chief Complaint  Follow-up (Chest pain, palpitations. )    Subjective    History of Present Illness {CC  Problem List  Visit  Diagnosis   Encounters  Notes  Medications  Labs  Result Review Imaging  Media :23}     Gildardo Prajapati presents to [facility] for   History of Present Illness   82-year-old male with GERD, hypertension, BPH, iron deficiency anemia, history of gastric ulcer, Thalassemia.     Patient with recent chest pain.  Patient had an abnormal myocardial perfusion stress test.  Seen by Dr. Jackson (10/25/22) who recommended left heart catheterization.  Patient has not scheduled.  Patient not started on aspirin currently due to history of gastric ulcers    Patient currently on Pepcid for possible GERD symptoms.  Metoprolol succinate.      Pt denies exertional chest pain, dyspnea.  No dizziness, near syncope, syncope.   Over all s/s have improved.  NO palpitations.     Weight is down 170 lb.  Plans to increase physical activity.       Objective     Vital Signs:   Vitals:    11/23/22 0904   BP: 140/52   Pulse: 61   Weight: 77.1 kg (170 lb)   Height: 180.3 cm (71\")     Body mass index is 23.71 kg/m².  Physical " Exam  Vitals reviewed.   Constitutional:       General: He is not in acute distress.  Pulmonary:      Effort: Pulmonary effort is normal.   Skin:     Coloration: Skin is not pale.   Neurological:      Mental Status: He is alert.   Psychiatric:         Mood and Affect: Mood normal.         Behavior: Behavior normal. Behavior is cooperative.              Result Review  Data Reviewed:{ Labs  Result Review  Imaging  Med Tab  Media :23}   Echocardiogram 10/20/2022: EF 63%, no significant valvular disease    Myocardial perfusion stress test 10/24/2022: Symptomatic angina with 4 mm ST downsloping depression at peak exercise no ischemia noted on perfusion studies.  EF greater than 70%              Assessment and Plan {CC Problem List  Visit Diagnosis  ROS  Review (Popup)  Health Maintenance  Quality  BestPractice  Medications  SmartSets  SnapShot Encounters  Media :23}   1. Chest pain, unspecified type  Pt wqith no ischemia on imaging but Symptomatic angina with 4 mm ST downsloping depression at peak exercise     Dr. Jackson recommended C.      Pt with improved s/s with use of h2 blocker.    He denies CP, pressure, dyspnea, dizziness, near syncope, syncope.    Pt would like to continue monitoring at this time due to improvement.    Discussed s/s to reports or to seek ED visit.  2. Primary hypertension  Continue BB  Continue to monitor    3. Palpitations  No palpitations.       I spent 20 minutes (face to face) caring for Gildardo on this date of service. This time includes time spent by me in the following activities:preparing for the visit, reviewing tests, performing a medically appropriate examination and/or evaluation , counseling and educating the patient/family/caregiver and documenting information in the medical record    Follow Up {Instructions Charge Capture  Follow-up Communications :23}   Return in about 6 weeks (around 1/4/2023) for Video visit, HTN, Chest pain.    Patient was given  instructions and counseling regarding his condition or for health maintenance advice. Please see specific information pulled into the AVS if appropriate.  Patient was instructed to call the [facility] with any questions, concerns, or worsening symptoms.

## 2022-12-15 ENCOUNTER — TELEPHONE (OUTPATIENT)
Dept: GASTROENTEROLOGY | Facility: CLINIC | Age: 82
End: 2022-12-15

## 2022-12-15 NOTE — TELEPHONE ENCOUNTER
RETURNED PATIENT'S PHONE CALL NO ANSWER LEFT MESSAGE THAT HAKAN WILL BE OUT OF OFFICE ON April 4 2023. ASK HIM TO CALL OFFICE TO SCHEDULE.

## 2023-02-06 ENCOUNTER — PATIENT MESSAGE (OUTPATIENT)
Dept: FAMILY MEDICINE CLINIC | Facility: CLINIC | Age: 83
End: 2023-02-06
Payer: MEDICARE

## 2023-02-06 DIAGNOSIS — M10.9 GOUT, UNSPECIFIED CAUSE, UNSPECIFIED CHRONICITY, UNSPECIFIED SITE: Primary | ICD-10-CM

## 2023-02-06 RX ORDER — COLCHICINE 0.6 MG/1
TABLET ORAL
Qty: 12 TABLET | Refills: 0 | Status: SHIPPED | OUTPATIENT
Start: 2023-02-06 | End: 2023-03-02

## 2023-03-02 ENCOUNTER — OFFICE VISIT (OUTPATIENT)
Dept: FAMILY MEDICINE CLINIC | Facility: CLINIC | Age: 83
End: 2023-03-02
Payer: MEDICARE

## 2023-03-02 VITALS
BODY MASS INDEX: 24.67 KG/M2 | HEIGHT: 71 IN | WEIGHT: 176.2 LBS | DIASTOLIC BLOOD PRESSURE: 58 MMHG | HEART RATE: 54 BPM | SYSTOLIC BLOOD PRESSURE: 142 MMHG | OXYGEN SATURATION: 99 %

## 2023-03-02 DIAGNOSIS — H61.23 BILATERAL IMPACTED CERUMEN: Primary | ICD-10-CM

## 2023-03-02 PROCEDURE — 99213 OFFICE O/P EST LOW 20 MIN: CPT | Performed by: INTERNAL MEDICINE

## 2023-03-02 NOTE — PROGRESS NOTES
"Gildardo Prajapati  1940  [id number]  Patient Care Team:  Wilbur Quinones MD as PCP - General (Internal Medicine)  Karan Jackson MD as Consulting Physician (Cardiology)  Rosa Lux APRN as Nurse Practitioner (Cardiology)    Gildardo Prajapati is a 83 y.o. male here today for follow up.     This patient is accompanied by their self who contributes to the history of their care.     Chief Complaint:    Chief Complaint   Patient presents with   • Ear Fullness     Rt ear is worse.        History of Present Illness:  I have reviewed and/or updated the patient's past medical, past surgical, family, social history, problem list and allergies as appropriate.     He is here with decreased hearing loss and tinnitus in the right ear.  He has a history of cerumen impaction.  He has tried Debrox, warm water flushes to no avail.  Slightly decreased hearing.  No vertigo.  There is discomfort and pressure.    Review of Systems   Constitutional: Negative.    HENT: Positive for hearing loss and tinnitus.        Vitals:    03/02/23 1024   BP: 142/58   Pulse: 54   SpO2: 99%   Weight: 79.9 kg (176 lb 3.2 oz)   Height: 180.3 cm (70.98\")     Body mass index is 24.59 kg/m².    Physical Exam  Vitals and nursing note reviewed.   Constitutional:       General: He is not in acute distress.     Appearance: He is well-developed. He is not diaphoretic.   HENT:      Head: Normocephalic and atraumatic.      Right Ear: Ear canal and external ear normal.      Left Ear: Ear canal and external ear normal.      Ears:      Comments: Is extensive palpation bilaterally.  Attempts were made to curettage for removal.  However, aborted secondary to discomfort.     Mouth/Throat:      Pharynx: No oropharyngeal exudate.   Eyes:      General: No scleral icterus.        Right eye: No discharge.      Conjunctiva/sclera: Conjunctivae normal.   Neck:      Thyroid: No thyromegaly.      Vascular: No JVD.      Trachea: No tracheal deviation. "   Cardiovascular:      Rate and Rhythm: Normal rate and regular rhythm.      Heart sounds: Normal heart sounds.      Comments: PMI nondisplaced  Pulmonary:      Effort: Pulmonary effort is normal.      Breath sounds: Normal breath sounds. No wheezing or rales.   Abdominal:      General: Bowel sounds are normal.      Palpations: Abdomen is soft.      Tenderness: There is no abdominal tenderness. There is no guarding or rebound.   Musculoskeletal:      Cervical back: Normal range of motion and neck supple.      Comments: Normal gait   Lymphadenopathy:      Cervical: No cervical adenopathy.   Skin:     General: Skin is warm and dry.      Capillary Refill: Capillary refill takes less than 2 seconds.      Coloration: Skin is not pale.      Findings: No rash.   Neurological:      Mental Status: He is alert and oriented to person, place, and time.      Motor: No abnormal muscle tone.      Coordination: Coordination normal.   Psychiatric:         Judgment: Judgment normal.         Procedures    Results Review:    None    Assessment/Plan:     Problem List Items Addressed This Visit        ENT    Cerumen impaction - Primary    Relevant Orders    Ambulatory Referral to ENT (Otolaryngology)       Plan of care reviewed with patient at the conclusion of today's visit. Education was provided regarding diagnosis and management.  Patient verbalizes understanding of and agreement with management plan.    No follow-ups on file.    Wilbur Quinones MD      Please note than portions of this note were completed [facility] a Voice Recognition Program

## 2023-03-03 ENCOUNTER — PATIENT MESSAGE (OUTPATIENT)
Dept: FAMILY MEDICINE CLINIC | Facility: CLINIC | Age: 83
End: 2023-03-03
Payer: MEDICARE

## 2023-03-08 ENCOUNTER — OFFICE VISIT (OUTPATIENT)
Dept: FAMILY MEDICINE CLINIC | Facility: CLINIC | Age: 83
End: 2023-03-08
Payer: MEDICARE

## 2023-03-08 VITALS
OXYGEN SATURATION: 98 % | DIASTOLIC BLOOD PRESSURE: 62 MMHG | SYSTOLIC BLOOD PRESSURE: 142 MMHG | HEIGHT: 71 IN | HEART RATE: 51 BPM | BODY MASS INDEX: 24.14 KG/M2 | WEIGHT: 172.4 LBS

## 2023-03-08 DIAGNOSIS — N40.1 BPH WITH OBSTRUCTION/LOWER URINARY TRACT SYMPTOMS: Primary | ICD-10-CM

## 2023-03-08 DIAGNOSIS — K29.60 GASTRITIS, EROSIVE: ICD-10-CM

## 2023-03-08 DIAGNOSIS — N13.8 BPH WITH OBSTRUCTION/LOWER URINARY TRACT SYMPTOMS: Primary | ICD-10-CM

## 2023-03-08 DIAGNOSIS — R94.39 ABNORMAL STRESS ECG: ICD-10-CM

## 2023-03-08 DIAGNOSIS — Z00.00 MEDICARE ANNUAL WELLNESS VISIT, SUBSEQUENT: ICD-10-CM

## 2023-03-08 DIAGNOSIS — D56.3 THALASSEMIA MINOR: ICD-10-CM

## 2023-03-08 DIAGNOSIS — I10 SYSTOLIC HYPERTENSION: ICD-10-CM

## 2023-03-08 PROCEDURE — 3078F DIAST BP <80 MM HG: CPT | Performed by: INTERNAL MEDICINE

## 2023-03-08 PROCEDURE — 1159F MED LIST DOCD IN RCRD: CPT | Performed by: INTERNAL MEDICINE

## 2023-03-08 PROCEDURE — 3077F SYST BP >= 140 MM HG: CPT | Performed by: INTERNAL MEDICINE

## 2023-03-08 PROCEDURE — 1170F FXNL STATUS ASSESSED: CPT | Performed by: INTERNAL MEDICINE

## 2023-03-08 PROCEDURE — G0439 PPPS, SUBSEQ VISIT: HCPCS | Performed by: INTERNAL MEDICINE

## 2023-03-08 NOTE — PROGRESS NOTES
The ABCs of the Annual Wellness Visit  Subsequent Medicare Wellness Visit    Subjective      Gildardo Prajapati is a 83 y.o. male who presents for a Subsequent Medicare Wellness Visit.    Gildardo is here for his Medicare wellness.  He upcoming ENT evaluation for hearing loss and cerumen impaction as we are unable to clear at last visit.  He we discussed his abnormal stress test.  Perfusion imaging was normal however he had significant 4 mm ST depression suggestive of multiple vessel disease.  He would like second opinion on this.  He does not take aspirin secondary to his aspirin induced gastritis.  On a positive note he is able to walk 2 miles a day at [facility].  Several days per week he will go out to [facility] and walk the [place] there.  No recurrence of chest pain.  No palpitations no syncope.  LUTS symptoms are improved on Flomax.  He remains current with his dentistry 3 times per year as well as ophthalmology.  Vaccinations are all up-to-date and preventative safety measures discussed.    The following portions of the patient's history were reviewed and   updated as appropriate:   He  has a past medical history of Abnormal ECG (8/18/2016 [place], [place]), Arthritis (8/29/2015  Dr. Vora), Colon polyp (11/5/2014 Dr. SRINATH Thomas), Diverticulosis (8/11/2003 Dr. SRINATH King), GERD (gastroesophageal reflux disease) (2/11/2015 Dr. Vora), Gout, History of stomach ulcers, HL (hearing loss) (8/23/2016 Dr. Gooden), Hypertension, and Peptic ulceration.  He does not have any pertinent problems on file.  He  has a past surgical history that includes Appendectomy; Cyst Removal; Colonoscopy (11/5/2014 Dr. SRINATH Thomas); Upper gastrointestinal endoscopy (11/06/2019); and Tonsillectomy (7/1[birth year]1947).  His family history includes Anemia in his father; Hyperlipidemia in his mother; Hypertension in his mother; Ulcerative colitis in his father.  He  reports that he quit smoking about 44 years ago. His  smoking use included cigarettes. He started smoking about 60 years ago. He has a 15.00 pack-year smoking history. He has never used smokeless tobacco. He reports that he does not currently use alcohol. He reports that he does not use drugs.  Current Outpatient Medications   Medication Sig Dispense Refill   • famotidine (PEPCID) 40 MG tablet Take 1 tablet by mouth Daily.     • metoprolol succinate XL (TOPROL-XL) 25 MG 24 hr tablet Take 1 tablet by mouth Daily. 90 tablet 3   • multivitamin with minerals tablet tablet Take 1 tablet by mouth Daily.     • tamsulosin (FLOMAX) 0.4 MG capsule 24 hr capsule TAKE ONE CAPSULE BY MOUTH DAILY 90 capsule 1     No current facility-administered medications for this visit.     Current Outpatient Medications on File Prior to Visit   Medication Sig   • famotidine (PEPCID) 40 MG tablet Take 1 tablet by mouth Daily.   • metoprolol succinate XL (TOPROL-XL) 25 MG 24 hr tablet Take 1 tablet by mouth Daily.   • multivitamin with minerals tablet tablet Take 1 tablet by mouth Daily.   • tamsulosin (FLOMAX) 0.4 MG capsule 24 hr capsule TAKE ONE CAPSULE BY MOUTH DAILY     No current facility-administered medications on file prior to visit.     He has No Known Allergies..    Compared to one year ago, the patient feels his physical   health is the same.    Compared to one year ago, the patient feels his mental   health is the same.    Recent Hospitalizations:  He was not admitted to the hospital during the last year.       Current Medical Providers:  Patient Care Team:  Wilbur Quinones MD as PCP - General (Internal Medicine)  Karan Jackson MD as Consulting Physician (Cardiology)  Rosa Lux APRN as Nurse Practitioner (Cardiology)    Outpatient Medications Prior to Visit   Medication Sig Dispense Refill   • famotidine (PEPCID) 40 MG tablet Take 1 tablet by mouth Daily.     • metoprolol succinate XL (TOPROL-XL) 25 MG 24 hr tablet Take 1 tablet by mouth Daily. 90 tablet 3   •  "multivitamin with minerals tablet tablet Take 1 tablet by mouth Daily.     • tamsulosin (FLOMAX) 0.4 MG capsule 24 hr capsule TAKE ONE CAPSULE BY MOUTH DAILY 90 capsule 1     No facility-administered medications prior to visit.       No opioid medication identified on active medication list. I have reviewed chart for other potential  high risk medication/s and harmful drug interactions in the elderly.          Aspirin is not on active medication list.   He has repeatedly tried to take this however is recurrent gastritis.    Patient Active Problem List   Diagnosis   • Right upper quadrant abdominal pain   • Left upper quadrant pain   • Polyp of colon   • Cerumen impaction   • Duodenal ulcer without hemorrhage or perforation   • Gastritis, erosive   • Pigmented skin lesion suspicious for malignant neoplasm   • Systolic hypertension   • BPH with obstruction/lower urinary tract symptoms   • Diverticulosis   • Gout   • Thalassemia minor   • Other chest pain   • Epigastric pain   • Medicare annual wellness visit, subsequent   • Abnormal stress ECG     Advance Care Planning3  Advance Directive is not on file.  ACP discussion was held with the patient during this visit. Patient does not have an advance directive, information provided.     Objective    Vitals:    03/08/23 0957   BP: 142/62   Pulse: 51   SpO2: 98%   Weight: 78.2 kg (172 lb 6.4 oz)   Height: 180.3 cm (70.98\")     Estimated body mass index is 24.06 kg/m² as calculated from the following:    Height as of this encounter: 180.3 cm (70.98\").    Weight as of this encounter: 78.2 kg (172 lb 6.4 oz).    BMI is within normal parameters. No other follow-up for BMI required.      Does the patient have evidence of cognitive impairment?   No            HEALTH RISK ASSESSMENT    Smoking Status:  Social History     Tobacco Use   Smoking Status Former   • Packs/day: 1.00   • Years: 15.00   • Pack years: 15.00   • Types: Cigarettes   • Start date: 1/1/1963   • Quit date: " 1978   • Years since quittin.2   Smokeless Tobacco Never     Alcohol Consumption:  Social History     Substance and Sexual Activity   Alcohol Use Not Currently    Comment: Two glasses per year, birthday and gi     Fall Risk Screen:    JOLIE Fall Risk Assessment was completed, and patient is at LOW risk for falls.Assessment completed on:3/8/2023    Depression Screening:  PHQ-2/PHQ-9 Depression Screening 3/8/2023   Little Interest or Pleasure in Doing Things 0-->not at all   Feeling Down, Depressed or Hopeless 0-->not at all   PHQ-9: Brief Depression Severity Measure Score 0       Health Habits and Functional and Cognitive Screening:  Functional & Cognitive Status 3/8/2023   Do you have difficulty preparing food and eating? No   Do you have difficulty bathing yourself, getting dressed or grooming yourself? No   Do you have difficulty using the toilet? No   Do you have difficulty moving around from place to place? No   Do you have trouble with steps or getting out of a bed or a chair? No   Current Diet Well Balanced Diet   Dental Exam Up to date   Eye Exam Up to date   Exercise (times per week) 5 times per week   Current Exercises Include Walking   Do you need help using the phone?  Yes   Are you deaf or do you have serious difficulty hearing?  Yes   Do you need help with transportation? No   Do you need help shopping? No   Do you need help preparing meals?  No   Do you need help with housework?  No   Do you need help with laundry? No   Do you need help taking your medications? No   Do you need help managing money? No   Do you ever drive or ride in a car without wearing a seat belt? No   Have you felt unusual stress, anger or loneliness in the last month? No   Who do you live with? Spouse   If you need help, do you have trouble finding someone available to you? No   Have you been bothered in the last four weeks by sexual problems? No   Do you have difficulty concentrating, remembering or making  decisions? No       Age-appropriate Screening Schedule:  Refer to the list below for future screening recommendations based on patient's age, sex and/or medical conditions. Orders for these recommended tests are listed in the plan section. The patient has been provided with a written plan.    Health Maintenance   Topic Date Due   • ANNUAL WELLNESS VISIT  03/08/2024   • TDAP/TD VACCINES (2 - Td or Tdap) 08/26/2030   • COVID-19 Vaccine  Completed   • INFLUENZA VACCINE  Completed   • Pneumococcal Vaccine 65+  Completed   • ZOSTER VACCINE  Completed                CMS Preventative Services Quick Reference  Risk Factors Identified During Encounter:    Hearing Problem: Referral to ENT ordered  Immunizations Discussed/Encouraged: current  Dental Screening Recommended  Vision Screening Recommended    The above risks/problems have been discussed with the patient.  Pertinent information has been shared with the patient in the After Visit Summary.    Diagnoses and all orders for this visit:    1. BPH with obstruction/lower urinary tract symptoms (Primary)    2. Thalassemia minor  Assessment & Plan:  He had labs done recently at [facility], these have not been scanned in the system however have been reviewed.  His ferritin and iron levels are adequate, hemoglobin hematocrit are adequate as well.      3. Systolic hypertension  Assessment & Plan:  Hypertension is unchanged.  Continue current treatment regimen.  Regular aerobic exercise.  Continue current medications.  Blood pressure will be reassessed at the next regular appointment.      4. Gastritis, erosive  Assessment & Plan:  Been intolerant of PPIs, continues on Pepcid and avoidance of aspirin.      5. Abnormal stress ECG  Assessment & Plan:  He is in agreement to get a second opinion from an independent cardiologist.  Have recommended [facility] Dr. Abraham Villalta for evaluation of his abnormal stress EKGs in the face of normal perfusion imaging suggestive of multivessel  coronary artery disease.  He would like to continue to avoid aspirin secondary to dyspepsia.    Orders:  -     Ambulatory Referral to Cardiology    6. Medicare annual wellness visit, subsequent      Follow Up:   Next Medicare Wellness visit to be scheduled in 1 year.      An After Visit Summary and PPPS were made available to the patient.

## 2023-03-08 NOTE — ASSESSMENT & PLAN NOTE
He had labs done recently at [facility], these have not been scanned in the system however have been reviewed.  His ferritin and iron levels are adequate, hemoglobin hematocrit are adequate as well.

## 2023-03-08 NOTE — ASSESSMENT & PLAN NOTE
He is in agreement to get a second opinion from an independent cardiologist.  Have recommended [facility] cardiology Dr. Abraham Villalta for evaluation of his abnormal stress EKGs in the face of normal perfusion imaging suggestive of multivessel coronary artery disease.  He would like to continue to avoid aspirin secondary to dyspepsia.

## 2023-03-13 ENCOUNTER — PATIENT MESSAGE (OUTPATIENT)
Dept: FAMILY MEDICINE CLINIC | Facility: CLINIC | Age: 83
End: 2023-03-13
Payer: MEDICARE

## 2023-03-22 ENCOUNTER — PATIENT MESSAGE (OUTPATIENT)
Dept: FAMILY MEDICINE CLINIC | Facility: CLINIC | Age: 83
End: 2023-03-22
Payer: MEDICARE

## 2023-04-11 ENCOUNTER — OFFICE VISIT (OUTPATIENT)
Dept: GASTROENTEROLOGY | Facility: CLINIC | Age: 83
End: 2023-04-11
Payer: MEDICARE

## 2023-04-11 VITALS
TEMPERATURE: 97.5 F | DIASTOLIC BLOOD PRESSURE: 76 MMHG | OXYGEN SATURATION: 95 % | WEIGHT: 174.8 LBS | SYSTOLIC BLOOD PRESSURE: 140 MMHG | HEART RATE: 59 BPM | HEIGHT: 71 IN | BODY MASS INDEX: 24.47 KG/M2

## 2023-04-11 DIAGNOSIS — K57.90 DIVERTICULOSIS: ICD-10-CM

## 2023-04-11 DIAGNOSIS — Z87.898 HISTORY OF CHEST PAIN: ICD-10-CM

## 2023-04-11 DIAGNOSIS — K44.9 HIATAL HERNIA: Primary | ICD-10-CM

## 2023-04-11 DIAGNOSIS — R94.8 ABNORMAL BILIARY HIDA SCAN: ICD-10-CM

## 2023-04-11 DIAGNOSIS — Z86.010 HISTORY OF COLONIC POLYPS: ICD-10-CM

## 2023-04-11 PROCEDURE — 1159F MED LIST DOCD IN RCRD: CPT | Performed by: NURSE PRACTITIONER

## 2023-04-11 PROCEDURE — 99214 OFFICE O/P EST MOD 30 MIN: CPT | Performed by: NURSE PRACTITIONER

## 2023-04-11 PROCEDURE — 1160F RVW MEDS BY RX/DR IN RCRD: CPT | Performed by: NURSE PRACTITIONER

## 2023-04-11 PROCEDURE — 3078F DIAST BP <80 MM HG: CPT | Performed by: NURSE PRACTITIONER

## 2023-04-11 PROCEDURE — 3077F SYST BP >= 140 MM HG: CPT | Performed by: NURSE PRACTITIONER

## 2023-04-11 NOTE — PROGRESS NOTES
GASTROENTEROLOGY OFFICE NOTE    Gildardo Prajapati  [id number]  1940    CARE TEAM  Patient Care Team:  Wilbur Quinones MD as PCP - General (Internal Medicine)  Karan Jackson MD as Consulting Physician (Cardiology)  Rosa Lux APRN as Nurse Practitioner (Cardiology)    Referring Provider: No ref. provider found    Chief Complaint   Patient presents with   • Hiatal Hernia        HISTORY OF PRESENT ILLNESS:   Gildardo Prajapati is a 83 y.o. male Who returns for approximate 5-month follow-up regarding hiatal hernia, chest pain.  He also has history of thalassemia, upper abdominal discomfort.  At last office visit, I suggested he try 2 Altoid mints prior to meals in the event esophageal spasm was contributing to chest pain and also provided a list of proton pump inhibitors over-the-counter he could try due to report of pantoprazole irritating his stomach.     He presented documented/printed information that in 2011 Dexilant was not effective, in 2015 and 2022 pantoprazole cause pain below rib cage, headaches; in 2019 omeprazole for 7 weeks with side effects and he discontinued use; famotidine 40 mg with partial improvement in 2020; Altoid mints approximately 5 months ago not effective; due to experiencing side effects with esomeprazole he discontinued use; lansoprazole caused pain below the rib cage in January 2023 he discontinued use.  He has not tried rabeprazole due to needing a prescription.    He has found that taking famotidine 40 mg twice daily which he reports was previously recommended by Dr. Thomas has been helpful for chest pain, also has history of upper abdominal discomfort.    He has tried excluding fried foods, alcohol, fatty meats, acidic vegetables and fruits and many spices over the years.  He has history of taking Tums or Pepto-Bismol as needed for sudden attacks of discomfort.    He would like to know if eating mushrooms is okay as he has recently increase his intake of  mushrooms.    He would also like to know if he could wait approximately 1 year before repeat colonoscopy which was previously suggested by Dr. Thomas to be considered in  2023 if the patient desired.   Past Medical History:   Diagnosis Date   • Abnormal ECG 2016 [facility] [place], [place]    Fat on Liver   • Arthritis 2015  Dr. Vora    Gout   • Colon polyp 2014 Dr. SRINATH Thomas    2 Polyps   • Diverticulosis 2003 Dr. SRINATH King    1 Polyp   • GERD (gastroesophageal reflux disease) 2015 Dr. Vora    GERD   • Gout    • History of stomach ulcers    • HL (hearing loss) 2016 Dr. Berkley Montemayor   • Hypertension    • Peptic ulceration         Past Surgical History:   Procedure Laterality Date   • APPENDECTOMY     • COLONOSCOPY  2014 Dr. SRINATH Thomas    [facility]. [place]; 2020 Dr. Thomas repeat 3 years   • CYST REMOVAL     • TONSILLECTOMY  1947    Removed   • UPPER GASTROINTESTINAL ENDOSCOPY  2019    EGD Dr. Thomas,10/17/2022 Dr. Thomas        Current Outpatient Medications on File Prior to Visit   Medication Sig   • famotidine (PEPCID) 40 MG tablet Take 1 tablet by mouth 2 (Two) Times a Day.   • metoprolol succinate XL (TOPROL-XL) 25 MG 24 hr tablet Take 1 tablet by mouth Daily.   • multivitamin with minerals tablet tablet Take 1 tablet by mouth Daily.   • tamsulosin (FLOMAX) 0.4 MG capsule 24 hr capsule TAKE ONE CAPSULE BY MOUTH DAILY     No current facility-administered medications on file prior to visit.       No Known Allergies    Family History   Problem Relation Age of Onset   • Hypertension Mother    • Hyperlipidemia Mother            • Ulcerative colitis Father    • Anemia Father            • Colon cancer Neg Hx    • Cancer Neg Hx    • Diabetes Neg Hx        Social History     Socioeconomic History   • Marital status:    Tobacco Use   • Smoking status: Former     Packs/day: 1.00     Years: 15.00     Pack years: 15.00     Types:  "Cigarettes     Start date: 1963     Quit date: 1978     Years since quittin.3   • Smokeless tobacco: Never   Vaping Use   • Vaping Use: Never used   Substance and Sexual Activity   • Alcohol use: Not Currently     Comment: Two glasses per year, birthday and Thanksgiving   • Drug use: Never   • Sexual activity: Yes     Partners: Female     Birth control/protection: None       PHYSICAL EXAM   /76 (BP Location: Left arm, Patient Position: Sitting, Cuff Size: Adult)   Pulse 59   Temp 97.5 °F (36.4 °C) (Infrared)   Ht 180.3 cm (70.98\")   Wt 79.3 kg (174 lb 12.8 oz)   SpO2 95%   BMI 24.39 kg/m²   Physical Exam  Constitutional:       General: He is not in acute distress.     Appearance: He is not toxic-appearing.   HENT:      Head: Normocephalic and atraumatic. No contusion.      Right Ear: External ear normal.      Left Ear: External ear normal.   Eyes:      General: Lids are normal. No scleral icterus.        Right eye: No discharge.         Left eye: No discharge.      Extraocular Movements: Extraocular movements intact.   Neck:      Trachea: Trachea normal.      Comments: No visible mass  No visible adenopathy  Cardiovascular:      Rate and Rhythm: Bradycardia present.   Pulmonary:      Effort: No respiratory distress.      Comments: Symmetrical expansion    Musculoskeletal:      Comments: Symmetrical movement of upper extremities  Symmetrical movement of lower extremities  No visible deformities   Skin:     General: Skin is warm and dry.      Coloration: Skin is not jaundiced.   Neurological:      General: No focal deficit present.      Mental Status: He is alert and oriented to person, place, and time.   Psychiatric:         Mood and Affect: Mood normal.         Behavior: Behavior normal.         Thought Content: Thought content normal.     Results Review:  2019 by Dr. Thomas, history of abnormal HIDA scan (HIDA scan on 10/10/2019 revealed gallbladder ejection fraction of 18% suggestive " of biliary dyskinesia/gallbladder dysfunction) and RUQ abdominal pain for which EGD was done .  EGD revealed erosive gastritis, hiatal hernia and multiple duodenal ulcerations likely from aspirin recommendations to await pathology report, start omeprazole 40 mg daily and treat H. pylori if present.  Biopsy of the duodenum without abnormality, gastric biopsy revealed focal chronic active gastritis, focal intestinal metaplasia and otherwise was without abnormality, biopsy of the esophagus revealed mild chronic gastritis.     11/18/2020 colonoscopy per Dr. Thomas with documentation of history of polyps (11/2014 with 2 polyps removed), diverticulosis revealed 2 polyps of rectum, polyp of transverse colon, 2 polyps of ascending colon, diverticulosis, repeat colonoscopy suggested if all are adenomas and screening is desired in 3 years.  Right colon polyp tubular adenoma, transverse colon polyp tubular adenoma and polyp at 12 cm hyperplastic polyp and tubular adenoma.  3-year repeat suggested if desired.     10/17/2022 EGD per Dr. Thomas revealed irregular Z-line at the GE junction, small hiatal hernia.  Recommendation to take pantoprazole 40 mg twice daily and await pathology results.  Biopsy of the duodenum without abnormality and biopsy of the GE junction revealed mild nonspecific chronic inflammation and negative for intestinal metaplasia.      11/3/2022 CBC with normal white blood cell count 4.7, hemoglobin slightly low but improved at 12.4, hematocrit normal 41.5 and platelet count normal 270.  Iron 148.  Ferritin 358.  ASSESSMENT / PLAN  1. Hiatal hernia  2. History of chest pain  - improvement in chest pain when taking famotidine 40 mg BID which he may continue  - the following information was reviewed as presented by the patient: in 2011 Dexilant was not effective, in 2015 and 2022 pantoprazole cause pain below rib cage, headaches; in 2019 omeprazole for 7 weeks with side effects and he discontinued use; famotidine  40 mg with partial improvement in 2020; Altoid mints 11/2022 not effective; due to experiencing side effects with esomeprazole 11/2022 he discontinued use ; lansoprazole caused pain below the rib cage and in January 2023 he discontinued use.  He has not tried rabeprazole due to needing a prescription (trial of rabeprazole could be considered in the future)  - printed information regarding FODMAPs provided due to mushrooms being a high FODMAP food that may cause gas, bloating, diarrhea (Gas could contribute to chest pain)  3. History of colonic polyps  4. Diverticulosis  - briefly discussed high fiber diet which he feels as though he eats   -Consider surveillance colonoscopy 11/2023 as previously suggested by Dr. Thomas if patient desires continued surveillance. We discussed some benefits of repeat colonoscopy this year and some risks of waiting such as possible growth or change of polyps if present as well as inability to tolerate prep and/or repeat colonoscopy if change/decline in overall health if he waits a year    5. Abnormal biliary HIDA scan  - HIDA in 2019 with GB ejection fraction of 18%, biliary dyskinesia possibly contributing to discomfort in upper abdomen but unknown at this time, consider referral to surgeon in the future      Return if symptoms worsen or fail to improve.    Rosa Levin, APRN  04/11/2023

## 2023-05-09 RX ORDER — TAMSULOSIN HYDROCHLORIDE 0.4 MG/1
CAPSULE ORAL
Qty: 90 CAPSULE | Refills: 1 | Status: SHIPPED | OUTPATIENT
Start: 2023-05-09

## 2023-05-09 NOTE — TELEPHONE ENCOUNTER
Rx Refill Note  Requested Prescriptions     Pending Prescriptions Disp Refills   • tamsulosin (FLOMAX) 0.4 MG capsule 24 hr capsule [Pharmacy Med Name: TAMSULOSIN HCL 0.4 MG CAPSULE] 90 capsule 1     Sig: TAKE ONE CAPSULE BY MOUTH DAILY      Last office visit with prescribing clinician: 3/8/2023   Last telemedicine visit with prescribing clinician: 3/8/2023   Next office visit with prescribing clinician: 9/8/2023                         Would you like a call back once the refill request has been completed: [] Yes [] No    If the office needs to give you a call back, can they leave a voicemail: [] Yes [] No    Romy Self MA  05/09/23, 09:39 EDT

## 2023-08-15 ENCOUNTER — TELEPHONE (OUTPATIENT)
Dept: FAMILY MEDICINE CLINIC | Facility: CLINIC | Age: 83
End: 2023-08-15
Payer: MEDICARE

## 2023-08-15 NOTE — TELEPHONE ENCOUNTER
"Caller: Gildardo Prajapati \"Freeman\"    Relationship: Self    Best call back number: [phone]    What medications are you currently taking:   Current Outpatient Medications on File Prior to Visit   Medication Sig Dispense Refill    famotidine (PEPCID) 40 MG tablet Take 1 tablet by mouth 2 (Two) Times a Day.      metoprolol succinate XL (TOPROL-XL) 25 MG 24 hr tablet Take 1 tablet by mouth Daily. 90 tablet 3    multivitamin with minerals tablet tablet Take 1 tablet by mouth Daily.      tamsulosin (FLOMAX) 0.4 MG capsule 24 hr capsule TAKE ONE CAPSULE BY MOUTH DAILY 90 capsule 1     No current facility-administered medications on file prior to visit.          Which medication are you concerned about: TAMSULOSIN    Who prescribed you this medication: DR HOOPER    What are your concerns: PATIENT HAD A FAINTING SPELL THIS MORNING AND WOULD LIKE TO KNOW HOW HE CAN STOP TAKING THIS MEDICATION. PLEASE ADVISE      "

## 2023-08-30 RX ORDER — FAMOTIDINE 40 MG/1
TABLET, FILM COATED ORAL
Qty: 90 TABLET | Refills: 2 | Status: SHIPPED | OUTPATIENT
Start: 2023-08-30

## 2023-08-30 NOTE — TELEPHONE ENCOUNTER
Rx Refill Note  Requested Prescriptions     Pending Prescriptions Disp Refills    famotidine (PEPCID) 40 MG tablet [Pharmacy Med Name: FAMOTIDINE 40 MG TABLET] 90 tablet      Sig: TAKE ONE TABLET BY MOUTH DAILY      Last office visit with prescribing clinician: 3/8/2023   Last telemedicine visit with prescribing clinician: Visit date not found   Next office visit with prescribing clinician: 9/8/2023                         Would you like a call back once the refill request has been completed: [] Yes [] No    If the office needs to give you a call back, can they leave a voicemail: [] Yes [] No    Poly Bynum MA  08/30/23, 08:36 EDT

## 2023-09-06 RX ORDER — FAMOTIDINE 40 MG/1
TABLET, FILM COATED ORAL
Qty: 90 TABLET | Refills: 2 | OUTPATIENT
Start: 2023-09-06

## 2023-09-06 NOTE — TELEPHONE ENCOUNTER
Rx Refill Note  Requested Prescriptions     Pending Prescriptions Disp Refills    famotidine (PEPCID) 40 MG tablet [Pharmacy Med Name: FAMOTIDINE 40 MG TABLET] 90 tablet 2     Sig: TAKE ONE TABLET BY MOUTH DAILY      Last office visit with prescribing clinician: 3/8/2023   Last telemedicine visit with prescribing clinician: Visit date not found   Next office visit with prescribing clinician: 9/8/2023                         Would you like a call back once the refill request has been completed: [] Yes [] No    If the office needs to give you a call back, can they leave a voicemail: [] Yes [] No    April GUSTAVO Magana  09/06/23, 09:50 EDT

## 2023-09-07 RX ORDER — ROSUVASTATIN CALCIUM 20 MG/1
TABLET, COATED ORAL
COMMUNITY
Start: 2023-08-01

## 2023-09-08 ENCOUNTER — OFFICE VISIT (OUTPATIENT)
Dept: FAMILY MEDICINE CLINIC | Facility: CLINIC | Age: 83
End: 2023-09-08
Payer: MEDICARE

## 2023-09-08 VITALS
DIASTOLIC BLOOD PRESSURE: 72 MMHG | OXYGEN SATURATION: 98 % | HEART RATE: 59 BPM | BODY MASS INDEX: 24.27 KG/M2 | SYSTOLIC BLOOD PRESSURE: 164 MMHG | HEIGHT: 71 IN | WEIGHT: 173.4 LBS

## 2023-09-08 DIAGNOSIS — K29.60 GASTRITIS, EROSIVE: ICD-10-CM

## 2023-09-08 DIAGNOSIS — I10 SYSTOLIC HYPERTENSION: Primary | ICD-10-CM

## 2023-09-08 DIAGNOSIS — N40.1 BPH WITH OBSTRUCTION/LOWER URINARY TRACT SYMPTOMS: ICD-10-CM

## 2023-09-08 DIAGNOSIS — N13.8 BPH WITH OBSTRUCTION/LOWER URINARY TRACT SYMPTOMS: ICD-10-CM

## 2023-09-08 PROBLEM — E78.5 DYSLIPIDEMIA: Status: ACTIVE | Noted: 2023-09-08

## 2023-09-08 PROCEDURE — 99214 OFFICE O/P EST MOD 30 MIN: CPT | Performed by: INTERNAL MEDICINE

## 2023-09-08 PROCEDURE — 3077F SYST BP >= 140 MM HG: CPT | Performed by: INTERNAL MEDICINE

## 2023-09-08 PROCEDURE — 3078F DIAST BP <80 MM HG: CPT | Performed by: INTERNAL MEDICINE

## 2023-09-08 RX ORDER — CLOTRIMAZOLE 1 %
1 CREAM (GRAM) TOPICAL 2 TIMES DAILY
Qty: 60 G | Refills: 2 | Status: SHIPPED | OUTPATIENT
Start: 2023-09-08

## 2023-09-08 RX ORDER — FINASTERIDE 5 MG/1
5 TABLET, FILM COATED ORAL DAILY
Qty: 90 TABLET | Refills: 2 | Status: SHIPPED | OUTPATIENT
Start: 2023-09-08

## 2023-09-08 NOTE — ASSESSMENT & PLAN NOTE
Hypertension is unchanged.  Continue current treatment regimen.  Dietary sodium restriction.  Regular aerobic exercise.  Continue current medications.  Blood pressure will be reassessed at the next regular appointment.  Clearly has whitecoat hypertension.  He will continue to ambulatory monitoring at home.

## 2023-09-08 NOTE — ASSESSMENT & PLAN NOTE
Incomplete voiding still present with Flomax.  We have added finasteride 5 mg daily.  Revisit in 6 months.  If Symptomatic still would consider urologic referral

## 2023-09-08 NOTE — PROGRESS NOTES
Gildardo Prajapati  1940  [id number]  Patient Care Team:  Wilbur Quinones MD as PCP - General (Internal Medicine)  Karan Jackson MD as Consulting Physician (Cardiology)  Rosa Lux APRN as Nurse Practitioner (Cardiology)    Gildardo Prajapati is a 83 y.o. male here today for follow up.     This patient is accompanied by their self who contributes to the history of their care.    Chief Complaint:    Chief Complaint   Patient presents with    Hypertension        History of Present Illness:  I have reviewed and/or updated the patient's past medical, past surgical, family, social history, problem list and allergies as appropriate.     He has stopped crestor 2/2 to upper abdominal pain. This resolved with cessations. He has an upcoming appointment with Dr. Villalta.    He is contiuing to take tamsulosin. He still has some frequency. Stopping made frequency much worse. He is interested in further avenuse.    His BP at home running 116-130 ( MAPS ~80S). Denies chest pan SOA  Blood pressure this morning was not 118/62 at home    He expereinced near syncope x 1 earilier in the summer without reocurrence. ( He had stood quickly from a bending position )    Review of Systems   Constitutional:  Negative for chills, fatigue, fever, unexpected weight gain and unexpected weight loss.   HENT:  Negative for ear pain, postnasal drip, sinus pressure and sore throat.    Eyes:  Negative for blurred vision, double vision and visual disturbance.   Respiratory:  Negative for cough, shortness of breath and wheezing.    Cardiovascular:  Negative for chest pain, palpitations and leg swelling.   Gastrointestinal:  Negative for abdominal pain, blood in stool, diarrhea, nausea and vomiting.   Endocrine: Negative for cold intolerance, heat intolerance, polydipsia, polyphagia and polyuria.   Genitourinary:  Negative for dysuria, flank pain and hematuria.   Musculoskeletal:  Negative for arthralgias and joint swelling.   Skin:   "Negative for dry skin and rash.   Neurological:  Positive for syncope. Negative for weakness, numbness and headache.   Psychiatric/Behavioral:  Negative for self-injury, suicidal ideas and depressed mood.      Vitals:    09/08/23 0846   BP: 164/72   Pulse: 59   SpO2: 98%   Weight: 78.7 kg (173 lb 6.4 oz)   Height: 180.3 cm (70.98\")     Body mass index is 24.2 kg/m².    Physical Exam  Vitals and nursing note reviewed.   Constitutional:       General: He is not in acute distress.     Appearance: He is well-developed. He is not diaphoretic.   HENT:      Head: Normocephalic and atraumatic.      Right Ear: External ear normal.      Left Ear: External ear normal.      Mouth/Throat:      Pharynx: No oropharyngeal exudate.   Eyes:      General: No scleral icterus.        Right eye: No discharge.      Conjunctiva/sclera: Conjunctivae normal.   Neck:      Thyroid: No thyromegaly.      Vascular: No JVD.      Trachea: No tracheal deviation.   Cardiovascular:      Rate and Rhythm: Normal rate and regular rhythm.      Heart sounds: Normal heart sounds.      Comments: PMI nondisplaced  Pulmonary:      Effort: Pulmonary effort is normal.      Breath sounds: Normal breath sounds. No wheezing or rales.   Abdominal:      General: Bowel sounds are normal.      Palpations: Abdomen is soft.      Tenderness: There is no abdominal tenderness. There is no guarding or rebound.   Musculoskeletal:      Cervical back: Normal range of motion and neck supple.   Lymphadenopathy:      Cervical: No cervical adenopathy.   Skin:     General: Skin is warm and dry.      Capillary Refill: Capillary refill takes less than 2 seconds.      Coloration: Skin is not pale.      Findings: No rash.   Neurological:      Mental Status: He is alert and oriented to person, place, and time.      Motor: No abnormal muscle tone.      Coordination: Coordination normal.   Psychiatric:         Judgment: Judgment normal.       Procedures    Results Review:    I reviewed the " patient's new clinical results.    Assessment/Plan:    Problem List Items Addressed This Visit          Cardiac and Vasculature    Systolic hypertension - Primary    Current Assessment & Plan     Hypertension is unchanged.  Continue current treatment regimen.  Dietary sodium restriction.  Regular aerobic exercise.  Continue current medications.  Blood pressure will be reassessed at the next regular appointment.  Clearly has whitecoat hypertension.  He will continue to ambulatory monitoring at home.           Relevant Medications    metoprolol succinate XL (TOPROL-XL) 25 MG 24 hr tablet       Gastrointestinal Abdominal     Gastritis, erosive    Relevant Medications    famotidine (PEPCID) 40 MG tablet       Genitourinary and Reproductive     BPH with obstruction/lower urinary tract symptoms    Current Assessment & Plan     Incomplete voiding still present with Flomax.  We have added finasteride 5 mg daily.  Revisit in 6 months.  If Symptomatic still would consider urologic referral         Relevant Medications    tamsulosin (FLOMAX) 0.4 MG capsule 24 hr capsule    finasteride (Proscar) 5 MG tablet       Plan of care reviewed with patient at the conclusion of today's visit. Education was provided regarding diagnosis and management.  Patient verbalizes understanding of and agreement with management plan.    Return in about 6 months (around 3/8/2024) for Medicare Wellness.    Wilbur Quinones MD      Please note than portions of this note were completed wth a Voice Recognition Program        Answers submitted by the patient for this visit:  Primary Reason for Visit (Submitted on 9/2/2023)  What is the primary reason for your visit?: Other  Other (Submitted on 9/2/2023)  Please describe your symptoms.: Frequent Urination!  Have you had these symptoms before?: Yes  How long have you been having these symptoms?: Greater than 2 weeks  Please list any medications you are currently taking for this condition.: Tamsulasin 0.4  mg 1/day  Please describe any probable cause for these symptoms. : To be discussed with Dr. Quinones

## 2023-10-30 RX ORDER — METOPROLOL SUCCINATE 25 MG/1
25 TABLET, EXTENDED RELEASE ORAL DAILY
Qty: 90 TABLET | Refills: 3 | Status: SHIPPED | OUTPATIENT
Start: 2023-10-30

## 2023-10-30 NOTE — TELEPHONE ENCOUNTER
Rx Refill Note  Requested Prescriptions     Pending Prescriptions Disp Refills    metoprolol succinate XL (TOPROL-XL) 25 MG 24 hr tablet [Pharmacy Med Name: METOPROLOL SUCC ER 25 MG TAB] 90 tablet 3     Sig: TAKE ONE TABLET BY MOUTH DAILY      Last office visit with prescribing clinician: 9/8/2023   Last telemedicine visit with prescribing clinician: Visit date not found   Next office visit with prescribing clinician: 3/11/2024                         Would you like a call back once the refill request has been completed: [] Yes [] No    If the office needs to give you a call back, can they leave a voicemail: [] Yes [] No    Billie Yeboah MA  10/30/23, 10:07 EDT

## 2023-11-01 RX ORDER — PANTOPRAZOLE SODIUM 40 MG/1
40 TABLET, DELAYED RELEASE ORAL 2 TIMES DAILY
Qty: 60 TABLET | Refills: 4 | Status: SHIPPED | OUTPATIENT
Start: 2023-11-01

## 2023-11-07 RX ORDER — TAMSULOSIN HYDROCHLORIDE 0.4 MG/1
CAPSULE ORAL
Qty: 90 CAPSULE | Refills: 1 | Status: SHIPPED | OUTPATIENT
Start: 2023-11-07

## 2023-11-07 NOTE — TELEPHONE ENCOUNTER
Rx Refill Note  Requested Prescriptions     Pending Prescriptions Disp Refills    tamsulosin (FLOMAX) 0.4 MG capsule 24 hr capsule [Pharmacy Med Name: TAMSULOSIN HCL 0.4 MG CAPSULE] 90 capsule 1     Sig: TAKE ONE CAPSULE BY MOUTH DAILY      Last office visit with prescribing clinician: 9/8/2023     Next office visit with prescribing clinician: 3/11/2024   Rossana Garcia MA  11/07/23, 18:23 EST

## 2024-05-03 RX ORDER — TAMSULOSIN HYDROCHLORIDE 0.4 MG/1
1 CAPSULE ORAL DAILY
Qty: 90 CAPSULE | Refills: 1 | Status: SHIPPED | OUTPATIENT
Start: 2024-05-03

## 2024-06-03 RX ORDER — FAMOTIDINE 40 MG/1
40 TABLET, FILM COATED ORAL DAILY
Qty: 90 TABLET | Refills: 2 | OUTPATIENT
Start: 2024-06-03

## 2024-10-21 RX ORDER — METOPROLOL SUCCINATE 25 MG/1
25 TABLET, EXTENDED RELEASE ORAL DAILY
Qty: 90 TABLET | Refills: 3 | Status: SHIPPED | OUTPATIENT
Start: 2024-10-21

## 2024-10-21 NOTE — TELEPHONE ENCOUNTER
Rx Refill Note  Requested Prescriptions     Pending Prescriptions Disp Refills    metoprolol succinate XL (TOPROL-XL) 25 MG 24 hr tablet [Pharmacy Med Name: METOPROLOL SUCC ER 25 MG TAB] 90 tablet 3     Sig: TAKE 1 TABLET BY MOUTH DAILY      Last office visit with prescribing clinician: 9/8/2023   Last telemedicine visit with prescribing clinician: Visit date not found   Next office visit with prescribing clinician: Visit date not found                         Would you like a call back once the refill request has been completed: [] Yes [] No    If the office needs to give you a call back, can they leave a voicemail: [] Yes [] No    Billie Yeboah MA  10/21/24, 14:42 EDT

## 2024-10-29 RX ORDER — TAMSULOSIN HYDROCHLORIDE 0.4 MG/1
1 CAPSULE ORAL DAILY
Qty: 90 CAPSULE | Refills: 1 | OUTPATIENT
Start: 2024-10-29

## 2024-10-29 NOTE — TELEPHONE ENCOUNTER
I called to confirm if patient was still seeing Dr Quinones as his PCP. It seems he is now seeing . He in fact is seeing a new PCP. I have removed Dr Quinones and declined the refill. Patient will defer to his new PCP.

## 2025-03-12 ENCOUNTER — TELEPHONE (OUTPATIENT)
Dept: FAMILY MEDICINE CLINIC | Facility: CLINIC | Age: 85
End: 2025-03-12

## 2025-03-12 NOTE — TELEPHONE ENCOUNTER
"Caller: Gildardo Prajapati \"Freeman\"    Relationship to patient: Self    Best call back number: [phone]     Chief complaint: NEW PATIENT    Type of visit: NEW PATIENT    Requested date: AS SOON AS POSSIBLE     Additional notes: PATIENT WAS PREVIOUSLY A PATIENT WITH DR. HOOPER. HE WOULD LIKE TO BE AGAIN, AS HE NOW HAS INSURANCE THAT IS TAKEN BY [facility], BUT DR. HOOPER IS NOT SHOWING ANY OPENINGS FOR NEW PATIENTS. HE WOULD LIKE TO ASK DR. HOOPER IF HE WOULD BE WILLING TO MAKE AN EXCEPTION AND TAKE HIM ON AS A PATIENT AGAIN. HIS NEW INSURANCE IS [employer]. HE IS CURRENTLY A PATIENT WITH DR. MORA AT THE RECOMMENDATION OF DR. HOOPER.  "

## 2025-05-15 ENCOUNTER — OFFICE VISIT (OUTPATIENT)
Dept: FAMILY MEDICINE CLINIC | Facility: CLINIC | Age: 85
End: 2025-05-15
Payer: MEDICARE

## 2025-05-15 VITALS
DIASTOLIC BLOOD PRESSURE: 78 MMHG | SYSTOLIC BLOOD PRESSURE: 126 MMHG | BODY MASS INDEX: 24.47 KG/M2 | WEIGHT: 175.4 LBS | HEART RATE: 78 BPM | OXYGEN SATURATION: 97 %

## 2025-05-15 DIAGNOSIS — E78.5 DYSLIPIDEMIA: ICD-10-CM

## 2025-05-15 DIAGNOSIS — D56.3 THALASSEMIA MINOR: ICD-10-CM

## 2025-05-15 DIAGNOSIS — R97.20 ELEVATED PSA: ICD-10-CM

## 2025-05-15 DIAGNOSIS — R55 VASOVAGAL SYNDROME: ICD-10-CM

## 2025-05-15 DIAGNOSIS — I10 SYSTOLIC HYPERTENSION: Primary | ICD-10-CM

## 2025-05-15 PROBLEM — K21.9 GERD WITHOUT ESOPHAGITIS: Status: ACTIVE | Noted: 2025-05-15

## 2025-05-15 PROBLEM — R39.9 LOWER URINARY TRACT SYMPTOMS (LUTS): Status: ACTIVE | Noted: 2025-05-15

## 2025-05-15 RX ORDER — AMLODIPINE BESYLATE 5 MG/1
5 TABLET ORAL DAILY
COMMUNITY
Start: 2025-03-11 | End: 2025-05-15

## 2025-05-15 RX ORDER — KETOCONAZOLE 20 MG/G
CREAM TOPICAL
COMMUNITY
Start: 2025-03-05

## 2025-05-15 RX ORDER — CLOTRIMAZOLE 1 %
1 CREAM (GRAM) TOPICAL 2 TIMES DAILY
Qty: 60 G | Refills: 2 | Status: SHIPPED | OUTPATIENT
Start: 2025-05-15

## 2025-05-15 RX ORDER — METHOCARBAMOL 500 MG/1
500 TABLET, FILM COATED ORAL 2 TIMES DAILY PRN
Qty: 60 TABLET | Refills: 1 | Status: SHIPPED | OUTPATIENT
Start: 2025-05-15

## 2025-05-15 RX ORDER — HYDROCORTISONE 25 MG/G
CREAM TOPICAL
COMMUNITY
Start: 2025-02-27

## 2025-05-15 RX ORDER — METHOCARBAMOL 500 MG/1
500 TABLET, FILM COATED ORAL 4 TIMES DAILY
COMMUNITY
End: 2025-05-15 | Stop reason: SDUPTHER

## 2025-05-15 RX ORDER — FAMOTIDINE 40 MG/1
40 TABLET, FILM COATED ORAL DAILY
COMMUNITY
Start: 2025-02-27

## 2025-05-15 RX ORDER — LOSARTAN POTASSIUM 25 MG/1
TABLET ORAL
COMMUNITY
Start: 2025-01-21

## 2025-05-15 NOTE — PROGRESS NOTES
Gildardo Prajapati  1940  [id number]  Patient Care Team:  Wilbur Quinones MD as PCP - General (Internal Medicine)  Karan Jackson MD as Consulting Physician (Cardiology)  Rosa Lux APRN as Nurse Practitioner (Cardiology)    Gildardo Prajapati is a 85 y.o. male here today to establish care.  This patient is accompanied by their self who contributes to the history of their care.    Chief Complaint:    Chief Complaint   Patient presents with    Dizziness    Hypertension        History of Present Illness:   85-year-old gentleman with hypertension, thalassemia trait, gastroesophageal reflux elevated PSA, LUTS here to establish. He feels he has cervical vertigo. Was sent to physical therapy- stopped secondary to to vigorous exercise. ( Upper body exercises.     His vertigo is described as tightness I his upper back and feeling faint. It would resolve with lying supine ( this has happened tight.) he does not describe spinning- but faintness. HE is improving with stretching, heat and prn heat. Overall this is improving. He has been taking prn methocarbamol     He was placed on losartan 25 mg along with metoprolol 25 mg xl daily. This is improving his blood pressure. He denies palpitations or chest pain. He exercises regularly.     He had a recurrent neck pain episode flair in May - his anne has been slightly elevated.  Upper extremity weakness numbness or tingling.    He did not tolerate finasteride-  he is able to sleep for 4 hrs at nigth prior to urination ( much improved on bid flomax ( nocturia 2-3 versus every hour urinary pain, and hematuria.  His reflux is currently controlled on Pepcid 40 mg daily.  He is intolerant of most PPI agents    Past Medical History:   Diagnosis Date    Abnormal ECG 8/18/2016 [facility] [place], [place]    Fat on Liver    Arthritis 8/29/2015  Dr. Vora    Gout    Benign prostatic hyperplasia     Colon polyp 11/5/2014 Dr. SRINATH Thomas    2 Polyps    Coronary artery disease  Oct. 2022 Dr. IGNACIO Villalta    Lesions    Diverticulitis of colon     Diverticulosis 2003 Dr. SRINATH King    1 Polyp    Fatty liver     GERD (gastroesophageal reflux disease) 2015 Dr. Quinonezrug    GERD    Gout     History of stomach ulcers     HL (hearing loss) 2016 Dr. Gooden    Diminished    Hypertension     Lactose intolerance     Peptic ulceration        Past Surgical History:   Procedure Laterality Date    APPENDECTOMY      COLONOSCOPY  2014 Dr. SRINATH Thomas    [facility]; 2020 Dr. Thomas repeat 3 years    CYST REMOVAL      TONSILLECTOMY  1947    Removed    UPPER GASTROINTESTINAL ENDOSCOPY  2019    EGD Dr. Thomas,10/17/2022 Dr. Thomas        Family History   Problem Relation Age of Onset    Hypertension Mother     Hyperlipidemia Mother             Ulcerative colitis Father     Anemia Father             Colon cancer Neg Hx     Cancer Neg Hx     Diabetes Neg Hx        Social History     Socioeconomic History    Marital status:    Tobacco Use    Smoking status: Former     Current packs/day: 0.00     Average packs/day: 1 pack/day for 16.0 years (16.0 ttl pk-yrs)     Types: Cigarettes     Start date: 1963     Quit date: 1978     Years since quittin.4    Smokeless tobacco: Never   Vaping Use    Vaping status: Never Used   Substance and Sexual Activity    Alcohol use: Not Currently     Comment: Two glasses per year, birthday and Thanksgiving    Drug use: Never    Sexual activity: Yes     Partners: Female     Birth control/protection: None       No Known Allergies    Review of Systems:    Review of Systems   Constitutional: Negative.    Respiratory: Negative.     Cardiovascular: Negative.  Negative for chest pain, palpitations and leg swelling.   Gastrointestinal: Negative.    Musculoskeletal:  Positive for myalgias and neck pain.   Neurological:  Positive for light-headedness. Negative for weakness and numbness.        With neck pain       Vitals:     05/15/25 0917   BP: 126/78   Pulse: 78   SpO2: 97%   Weight: 79.6 kg (175 lb 6.4 oz)     Body mass index is 24.47 kg/m².      Current Outpatient Medications:     clotrimazole (LOTRIMIN) 1 % cream, Apply 1 Application topically to the appropriate area as directed 2 (Two) Times a Day., Disp: 60 g, Rfl: 2    famotidine (PEPCID) 40 MG tablet, Take 1 tablet by mouth Daily., Disp: , Rfl:     hydrocortisone 2.5 % cream, Apply  topically to the appropriate area as directed., Disp: , Rfl:     ketoconazole (NIZORAL) 2 % cream, , Disp: , Rfl:     losartan (COZAAR) 25 MG tablet, , Disp: , Rfl:     methocarbamol (ROBAXIN) 500 MG tablet, Take 1 tablet by mouth 2 (Two) Times a Day As Needed for Muscle Spasms., Disp: 60 tablet, Rfl: 1    metoprolol succinate XL (TOPROL-XL) 25 MG 24 hr tablet, TAKE 1 TABLET BY MOUTH DAILY, Disp: 90 tablet, Rfl: 3    multivitamin with minerals tablet tablet, Take 1 tablet by mouth Daily., Disp: , Rfl:     rosuvastatin (CRESTOR) 20 MG tablet, , Disp: , Rfl:     tamsulosin (FLOMAX) 0.4 MG capsule 24 hr capsule, TAKE 1 CAPSULE BY MOUTH DAILY, Disp: 90 capsule, Rfl: 1    Physical Exam:    Physical Exam  Vitals reviewed.   Constitutional:       Appearance: Normal appearance. He is well-developed.   HENT:      Head: Normocephalic and atraumatic.   Eyes:      General:         Right eye: No discharge.         Left eye: No discharge.      Conjunctiva/sclera: Conjunctivae normal.   Cardiovascular:      Rate and Rhythm: Normal rate and regular rhythm.   Pulmonary:      Effort: Pulmonary effort is normal. No respiratory distress.      Breath sounds: Normal breath sounds. No wheezing.   Chest:      Chest wall: No tenderness.   Abdominal:      General: Bowel sounds are normal.      Palpations: Abdomen is soft.   Genitourinary:     Comments: No CVA tendernesss   Skin:     General: Skin is warm and dry.   Neurological:      Mental Status: He is alert and oriented to person, place, and time.   Psychiatric:          "Mood and Affect: Mood normal.         Behavior: Behavior normal.         Procedures    Results Review:    None    Assessment/Plan:    Problem List Items Addressed This Visit       Systolic hypertension - Primary    Current Assessment & Plan   Hypertension is stable and controlled  Continue current treatment regimen.  Regular aerobic exercise.  Ambulatory blood pressure monitoring.  Blood pressure will be reassessed in 6 months.         Relevant Medications    metoprolol succinate XL (TOPROL-XL) 25 MG 24 hr tablet    losartan (COZAAR) 25 MG tablet    Thalassemia minor    Dyslipidemia    Overview   Currently on no medications secondary to sideeffectshe has an appointment upcoming to discuss with cardiac cardiology         Elevated PSA    Current Assessment & Plan   He is no longer seeing urology, he declines further PSA evaluation, I concur         Vasovagal syndrome    Current Assessment & Plan   Suspect what he is describing as \"cervical vertigo\" is an intense vasovagal reaction secondary to acute onset of cervical spasm and acute pain.  Description is more consistent with this.  Fortunately his neck pain is improving.  He will maintain hydration and continue current medications.  If symptoms start to become more frequent, or worse, we will revisit physical therapy plus/minus consider MCOT            Plan of care reviewed with patient at the conclusion of today's visit. Education was provided regarding diagnosis and management.  Patient verbalizes understanding of and agreement with management plan.    Return in about 6 months (around 11/15/2025).    Wilbur Quinones MD      Please note than portions of this note were completed wt a Voice Recognition Program          "

## 2025-05-15 NOTE — ASSESSMENT & PLAN NOTE
"Suspect what he is describing as \"cervical vertigo\" is an intense vasovagal reaction secondary to acute onset of cervical spasm and acute pain.  Description is more consistent with this.  Fortunately his neck pain is improving.  He will maintain hydration and continue current medications.  If symptoms start to become more frequent, or worse, we will revisit physical therapy plus/minus consider MCOT  "

## 2025-05-22 ENCOUNTER — PATIENT ROUNDING (BHMG ONLY) (OUTPATIENT)
Dept: FAMILY MEDICINE CLINIC | Facility: CLINIC | Age: 85
End: 2025-05-22
Payer: MEDICARE

## 2025-08-26 RX ORDER — METHOCARBAMOL 500 MG/1
500 TABLET, FILM COATED ORAL 2 TIMES DAILY PRN
Qty: 180 TABLET | Refills: 0 | Status: SHIPPED | OUTPATIENT
Start: 2025-08-26